# Patient Record
Sex: FEMALE | Race: BLACK OR AFRICAN AMERICAN | NOT HISPANIC OR LATINO | Employment: OTHER | URBAN - METROPOLITAN AREA
[De-identification: names, ages, dates, MRNs, and addresses within clinical notes are randomized per-mention and may not be internally consistent; named-entity substitution may affect disease eponyms.]

---

## 2017-06-13 ENCOUNTER — HOSPITAL ENCOUNTER (EMERGENCY)
Facility: HOSPITAL | Age: 27
Discharge: HOME | End: 2017-06-14
Attending: EMERGENCY MEDICINE
Payer: MEDICAID

## 2017-06-13 DIAGNOSIS — K80.50 BILIARY COLIC: Primary | ICD-10-CM

## 2017-06-13 DIAGNOSIS — R10.11 ABDOMINAL PAIN, RIGHT UPPER QUADRANT: ICD-10-CM

## 2017-06-13 DIAGNOSIS — R10.9 ABDOMINAL PAIN: ICD-10-CM

## 2017-06-13 LAB
ALBUMIN SERPL BCP-MCNC: 3.7 G/DL
ALP SERPL-CCNC: 58 U/L
ALT SERPL W/O P-5'-P-CCNC: 29 U/L
ANION GAP SERPL CALC-SCNC: 10 MMOL/L
AST SERPL-CCNC: 47 U/L
B-HCG UR QL: NEGATIVE
BASOPHILS # BLD AUTO: 0.07 K/UL
BASOPHILS NFR BLD: 1 %
BILIRUB SERPL-MCNC: 0.2 MG/DL
BILIRUB UR QL STRIP: NEGATIVE
BUN SERPL-MCNC: 6 MG/DL
CALCIUM SERPL-MCNC: 9.8 MG/DL
CHLORIDE SERPL-SCNC: 107 MMOL/L
CLARITY UR REFRACT.AUTO: CLEAR
CO2 SERPL-SCNC: 26 MMOL/L
COLOR UR AUTO: YELLOW
CREAT SERPL-MCNC: 0.8 MG/DL
CTP QC/QA: YES
DIFFERENTIAL METHOD: ABNORMAL
EOSINOPHIL # BLD AUTO: 0.5 K/UL
EOSINOPHIL NFR BLD: 6.9 %
ERYTHROCYTE [DISTWIDTH] IN BLOOD BY AUTOMATED COUNT: 13 %
EST. GFR  (AFRICAN AMERICAN): >60 ML/MIN/1.73 M^2
EST. GFR  (NON AFRICAN AMERICAN): >60 ML/MIN/1.73 M^2
GLUCOSE SERPL-MCNC: 82 MG/DL
GLUCOSE UR QL STRIP: NEGATIVE
HCT VFR BLD AUTO: 36.8 %
HGB BLD-MCNC: 12.6 G/DL
HGB UR QL STRIP: NEGATIVE
KETONES UR QL STRIP: NEGATIVE
LEUKOCYTE ESTERASE UR QL STRIP: NEGATIVE
LIPASE SERPL-CCNC: 21 U/L
LYMPHOCYTES # BLD AUTO: 3.4 K/UL
LYMPHOCYTES NFR BLD: 49.4 %
MCH RBC QN AUTO: 31 PG
MCHC RBC AUTO-ENTMCNC: 34.2 %
MCV RBC AUTO: 91 FL
MONOCYTES # BLD AUTO: 0.8 K/UL
MONOCYTES NFR BLD: 11.3 %
NEUTROPHILS # BLD AUTO: 2.2 K/UL
NEUTROPHILS NFR BLD: 31.3 %
NITRITE UR QL STRIP: NEGATIVE
PH UR STRIP: 7 [PH] (ref 5–8)
PLATELET # BLD AUTO: 280 K/UL
PMV BLD AUTO: 10.1 FL
POTASSIUM SERPL-SCNC: 4.3 MMOL/L
PROT SERPL-MCNC: 8.4 G/DL
PROT UR QL STRIP: NEGATIVE
RBC # BLD AUTO: 4.06 M/UL
SODIUM SERPL-SCNC: 143 MMOL/L
SP GR UR STRIP: 1.01 (ref 1–1.03)
URN SPEC COLLECT METH UR: NORMAL
UROBILINOGEN UR STRIP-ACNC: 4 EU/DL
WBC # BLD AUTO: 6.97 K/UL

## 2017-06-13 PROCEDURE — 83690 ASSAY OF LIPASE: CPT

## 2017-06-13 PROCEDURE — 25000003 PHARM REV CODE 250: Performed by: EMERGENCY MEDICINE

## 2017-06-13 PROCEDURE — 99284 EMERGENCY DEPT VISIT MOD MDM: CPT | Mod: 25

## 2017-06-13 PROCEDURE — 96374 THER/PROPH/DIAG INJ IV PUSH: CPT

## 2017-06-13 PROCEDURE — 85025 COMPLETE CBC W/AUTO DIFF WBC: CPT

## 2017-06-13 PROCEDURE — 81003 URINALYSIS AUTO W/O SCOPE: CPT

## 2017-06-13 PROCEDURE — 99285 EMERGENCY DEPT VISIT HI MDM: CPT | Mod: ,,, | Performed by: EMERGENCY MEDICINE

## 2017-06-13 PROCEDURE — 81025 URINE PREGNANCY TEST: CPT | Performed by: FAMILY MEDICINE

## 2017-06-13 PROCEDURE — 80053 COMPREHEN METABOLIC PANEL: CPT

## 2017-06-13 RX ORDER — DIAZEPAM 2 MG/1
2 TABLET ORAL 3 TIMES DAILY
COMMUNITY

## 2017-06-13 RX ORDER — FERROUS SULFATE, DRIED 160(50) MG
1 TABLET, EXTENDED RELEASE ORAL DAILY
COMMUNITY

## 2017-06-13 RX ORDER — FAMOTIDINE 10 MG/ML
20 INJECTION INTRAVENOUS
Status: COMPLETED | OUTPATIENT
Start: 2017-06-13 | End: 2017-06-13

## 2017-06-13 RX ADMIN — METOCLOPRAMIDE HYDROCHLORIDE 20 MG: 5 INJECTION, SOLUTION INTRAMUSCULAR; INTRAVENOUS at 10:06

## 2017-06-14 VITALS
HEART RATE: 82 BPM | BODY MASS INDEX: 18.1 KG/M2 | TEMPERATURE: 97 F | HEIGHT: 64 IN | DIASTOLIC BLOOD PRESSURE: 70 MMHG | SYSTOLIC BLOOD PRESSURE: 135 MMHG | RESPIRATION RATE: 18 BRPM | WEIGHT: 106 LBS | OXYGEN SATURATION: 100 %

## 2017-06-14 RX ORDER — HYDROCODONE BITARTRATE AND ACETAMINOPHEN 5; 325 MG/1; MG/1
1 TABLET ORAL EVERY 4 HOURS PRN
Qty: 12 TABLET | Refills: 0 | Status: SHIPPED | OUTPATIENT
Start: 2017-06-14 | End: 2017-07-03

## 2017-06-14 NOTE — ED TRIAGE NOTES
Per sister, patient vomiting last Wednesday all day. Over the next few days, pt c/o abdominal pain and tenderness. Pt reports RLQ abdominal pain.Pt c/o bilateral foot swelling and numb lips. Pt has had decreased appetite and no BM since Wednesday.

## 2017-06-14 NOTE — ED NOTES
LOC: The patient is awake, alert, aware of environment with an appropriate affect.  APPEARANCE: Pt resting comfortably, in no acute distress  SKIN: Skin warm, dry and intact, normal skin turgor, moist mucus membranes  RESPIRATORY: Airway is open and patent, respirations are spontaneous  CARDIAC: Normal rate and rhythm  ABDOMEN: Soft, tender to RLQ, nondistended. Bowel sounds present. No BW since Wednesday, decreased appetite.  NEUROLOGIC: patient moving all extremities spontaneously  Follows all commands appropriately  MUSCULOSKELETAL: Hx cerebral palsy.

## 2017-06-14 NOTE — ED PROVIDER NOTES
Encounter Date: 6/13/2017    SCRIBE #1 NOTE: I, Mahnazchrista Blanca, am scribing for, and in the presence of, Dr. Decker.       History     Chief Complaint   Patient presents with    Abdominal Pain     Patient had a stomach virus last week. Patient reports abdominal pain. Patient has been taking Ibuprofen, no relief. Pt also reports acid reflux and bilateral foot swelling     Review of patient's allergies indicates:   Allergen Reactions    Shellfish containing products Anaphylaxis    Pcn [penicillins]      Hives     Time seen by provider: 10:33 PM    This is a 26 y.o. female with a PMHx of cerebral palsy and GERD who presents with complaint of abdominal pain. The patient's caregiver reports about a week ago she woke up with nausea and vomiting. This lasted one day and resolved but was followed by right sided abdominal pain that has persisted since. The patient is concerned about the chronicity of her pain. She denies any nausea, vomiting, diarrhea since a week ago, she has never had associated fever.       The history is provided by the patient and a caregiver.     Past Medical History:   Diagnosis Date    Cerebral palsy     GERD (gastroesophageal reflux disease)     Scoliosis      Past Surgical History:   Procedure Laterality Date    HIP SURGERY  2000    Patient has surgery to straighten leg bone.     HIP SURGERY      vascular pump       Family History   Problem Relation Age of Onset    Diabetes Maternal Grandmother     Heart disease Maternal Grandmother      Social History   Substance Use Topics    Smoking status: Never Smoker    Smokeless tobacco: Not on file    Alcohol use No     Review of Systems   Constitutional: Negative for chills and fever.   HENT: Negative for facial swelling and nosebleeds.    Eyes: Negative for visual disturbance.   Respiratory: Negative for cough and shortness of breath.    Cardiovascular: Negative for chest pain and palpitations.   Gastrointestinal: Positive for abdominal pain.  Negative for abdominal distention, diarrhea, nausea and vomiting.   Genitourinary: Negative for difficulty urinating, dysuria, frequency and hematuria.   Musculoskeletal: Negative for neck pain and neck stiffness.   Skin: Negative for rash.   Neurological: Negative for seizures, syncope and speech difficulty.       Physical Exam     Initial Vitals [06/13/17 1918]   BP Pulse Resp Temp SpO2   113/74 74 18 97 °F (36.1 °C) 99 %     Physical Exam    Nursing note and vitals reviewed.  Constitutional: She appears well-developed and well-nourished. She is not diaphoretic. No distress.   HENT:   Head: Normocephalic and atraumatic.   Eyes: EOM are normal. Pupils are equal, round, and reactive to light.   Neck: Normal range of motion. Neck supple.   Cardiovascular: Normal rate and regular rhythm. Exam reveals no gallop and no friction rub.    No murmur heard.  Pulmonary/Chest: Breath sounds normal. No respiratory distress. She has no wheezes. She has no rhonchi. She has no rales.   Abdominal: Soft. She exhibits no distension. There is tenderness. There is no rebound and no guarding.   Mild to moderate right upper quadrant tenderness to palpation.    Musculoskeletal: Normal range of motion. She exhibits no edema or tenderness.   Neurological: She is alert and oriented to person, place, and time. She has normal strength.   Spastic movements of extremities consistent with cerebral palsy. Neurologically at baseline.    Skin: Skin is warm and dry. No rash noted. No erythema.   Psychiatric: She has a normal mood and affect. Her behavior is normal. Judgment and thought content normal.         ED Course   Procedures  Labs Reviewed   CBC W/ AUTO DIFFERENTIAL - Abnormal; Notable for the following:        Result Value    Hematocrit 36.8 (*)     Gran% 31.3 (*)     Lymph% 49.4 (*)     All other components within normal limits   COMPREHENSIVE METABOLIC PANEL - Abnormal; Notable for the following:     AST 47 (*)     All other components  within normal limits   LIPASE   URINALYSIS, REFLEX TO URINE CULTURE   POCT URINE PREGNANCY          X-Rays:   Independently Interpreted Readings:   Other Readings:  Right upper quadrant ultrasound: acute cholecystis.     Medical Decision Making:   History:   Old Medical Records: I decided to obtain old medical records.  Initial Assessment:   My initial differential diagnoses include, but are not limited to: cholecystitis, hepatitis, pancreatitis, gastritis. Patient with 5 days of right upper quadrant abdominal pain. Will check abdominal labs, right upper quadrant ultrasound and treat with Pepcid. If all are negative, will likely discharge home.   Independently Interpreted Test(s):   I have ordered and independently interpreted X-rays - see prior notes.  Clinical Tests:   Lab Tests: Ordered and Reviewed  Radiological Study: Ordered  ED Management:  Patient seen by general surgery. They reviewed imaging and examined to patient. They feel this is more consistent with biliary colic and does not require emergent surgery will discharge home and have dysfunctional uterine bleeding with general surgery as an outpatient.   Other:   I discussed test(s) with the performing physician.       <> Summary of the Findings: RUQ US: concerning for acute cholecystitis  I have discussed this case with another health care provider.       <> Summary of the Discussion: Surg            Scribe Attestation:   Scribe #1: I performed the above scribed service and the documentation accurately describes the services I performed. I attest to the accuracy of the note.    Attending Attestation:           Physician Attestation for Scribe:  Physician Attestation Statement for Scribe #1: I, Mahnaz Blanca, reviewed documentation, as scribed by Dr. Decker in my presence, and it is both accurate and complete.                 ED Course     Clinical Impression:   The primary encounter diagnosis was Biliary colic. A diagnosis of Abdominal pain was also  pertinent to this visit.    Disposition:   Disposition: Discharged  Condition: Stable       Jeffery Decker III, MD  06/20/17 0651

## 2017-06-14 NOTE — ED NOTES
I acknowledge care of this pt. The patient is awake, alert, with cooperative with a calm affect. Airway is open and patent, respirations are spontaneous; normal effort and rate noted, skin warm and dry, moves all extremities well. No apparent distress noted, bed placed in low position, side rails up x2. Pt aware of POC. Offers no complaints at this time. Family member at bedside.

## 2017-06-27 ENCOUNTER — OFFICE VISIT (OUTPATIENT)
Dept: SURGERY | Facility: CLINIC | Age: 27
End: 2017-06-27
Payer: MEDICAID

## 2017-06-27 VITALS — HEART RATE: 70 BPM | TEMPERATURE: 98 F | SYSTOLIC BLOOD PRESSURE: 110 MMHG | DIASTOLIC BLOOD PRESSURE: 58 MMHG

## 2017-06-27 DIAGNOSIS — K80.20 GALLSTONES: Primary | ICD-10-CM

## 2017-06-27 DIAGNOSIS — K80.50 BILIARY COLIC: ICD-10-CM

## 2017-06-27 PROCEDURE — 99999 PR PBB SHADOW E&M-EST. PATIENT-LVL IV: CPT | Mod: PBBFAC,,, | Performed by: PHYSICIAN ASSISTANT

## 2017-06-27 PROCEDURE — 99214 OFFICE O/P EST MOD 30 MIN: CPT | Mod: PBBFAC | Performed by: PHYSICIAN ASSISTANT

## 2017-06-27 PROCEDURE — 99203 OFFICE O/P NEW LOW 30 MIN: CPT | Mod: S$PBB,,, | Performed by: PHYSICIAN ASSISTANT

## 2017-06-27 RX ORDER — SODIUM CHLORIDE 9 MG/ML
INJECTION, SOLUTION INTRAVENOUS CONTINUOUS
Status: CANCELLED | OUTPATIENT
Start: 2017-06-27

## 2017-06-27 RX ORDER — NORETHINDRONE ACETATE AND ETHINYL ESTRADIOL AND FERROUS FUMARATE 1MG-20(24)
KIT ORAL
Refills: 11 | COMMUNITY
Start: 2017-05-31 | End: 2017-07-03 | Stop reason: SDUPTHER

## 2017-06-27 NOTE — PROGRESS NOTES
History & Physical    SUBJECTIVE:     History of Present Illness:  Patient is a 26 y.o. female presents with RUQ pain and vomiting which woke her up from sleep. Onset of symptoms was abrupt starting a few weeks ago with gradually worsening course since that time. She has a past medical history of cerebral palsy and GERD. She states she threw up and had RUQ pain after eating fatty foods. She has now adjusted her diet and has not had an episode since. She denies nausea. Bowel movements have been regular. Patient denies fevers or chills. Symptoms are aggravated by certain foods. Symptoms improve with food avoidance. She is here for evaluation of her gallbladder for removal.     Chief Complaint   Patient presents with    Consult       Review of patient's allergies indicates:   Allergen Reactions    Shellfish containing products Anaphylaxis    Pcn [penicillins]      Hives       Current Outpatient Prescriptions   Medication Sig Dispense Refill    calcium-vitamin D3 500 mg(1,250mg) -200 unit per tablet Take 1 tablet by mouth 2 (two) times daily with meals.      diazePAM (VALIUM) 2 MG tablet Take 2 mg by mouth every 6 (six) hours as needed for Anxiety.      famotidine (PEPCID) 40 MG tablet Take 40 mg by mouth once daily.        hydrocodone-acetaminophen 5-325mg (NORCO) 5-325 mg per tablet Take 1 tablet by mouth every 4 (four) hours as needed for Pain. 12 tablet 0    ibuprofen (ADVIL,MOTRIN) 400 MG tablet Take 1 tablet (400 mg total) by mouth every 6 (six) hours as needed (pain). 15 tablet 0    MINASTRIN 24 FE 1 mg-20 mcg(24) /75 mg (4) Chew CSW ONE T  D  11    norethindrone-ethinyl estradiol (MICROGESTIN 1/20) 1-20 mg-mcg per tablet Take 1 tablet by mouth once daily.        pantoprazole (PROTONIX) 40 MG tablet Take 40 mg by mouth once daily.         No current facility-administered medications for this visit.        Past Medical History:   Diagnosis Date    Cerebral palsy     GERD (gastroesophageal reflux  disease)     Scoliosis      Past Surgical History:   Procedure Laterality Date    HIP SURGERY  2000    Patient has surgery to straighten leg bone.     HIP SURGERY      vascular pump       Family History   Problem Relation Age of Onset    Diabetes Maternal Grandmother     Heart disease Maternal Grandmother      Social History   Substance Use Topics    Smoking status: Never Smoker    Smokeless tobacco: Not on file    Alcohol use No        Review of Systems:  Review of Systems   Constitutional: Negative for chills and fever.   HENT: Negative for congestion and voice change.    Eyes: Negative for photophobia and visual disturbance.   Respiratory: Negative for cough and shortness of breath.    Cardiovascular: Negative for chest pain and palpitations.   Gastrointestinal: Positive for abdominal pain and vomiting. Negative for constipation and nausea.   Endocrine: Negative for cold intolerance and heat intolerance.   Musculoskeletal: Negative for arthralgias and myalgias.   Skin: Negative for rash and wound.   Allergic/Immunologic: Negative for immunocompromised state.   Neurological: Negative for tremors and weakness.   Psychiatric/Behavioral: Negative for agitation.       OBJECTIVE:     Vital Signs (Most Recent)  Temp: 98.3 °F (36.8 °C) (06/27/17 1128)  Pulse: 70 (06/27/17 1128)  BP: (!) 110/58 (06/27/17 1128)           Physical Exam:  Physical Exam   Constitutional: She appears well-developed and well-nourished. No distress.   HENT:   Head: Normocephalic and atraumatic.   Eyes: No scleral icterus.   Neck: Normal range of motion.   Cardiovascular: Normal rate and regular rhythm.    Pulmonary/Chest: Effort normal. No respiratory distress.   Abdominal: Soft. She exhibits no distension. There is tenderness (+RUQ).   Musculoskeletal: She exhibits no edema or tenderness.   In wheelchair   Neurological: She is alert.   Skin: Skin is warm and dry.     Diagnostic Results:  Narrative     Time of Procedure: 06/14/17  00:19:00  Accession # [id number]    Reason for study: Abdominal pain, right upper quadrant    Comparison: CT 8/16/2011    Technique: Limited right upper quadrant ultrasound was performed.    Findings: The liver is normal in size measuring 16cm.  Hepatic parenchyma is homogeneous without evidence for masses.  No intra- or extrahepatic biliary ductal dilatation. The common bile duct measures 0.3 cm.  The gallbladder is distended with biliary sludge and numerous mobile stones the largest measuring 1.0 cm. Sonographic Tse's sign is positive. Gallbladder wall is not hyperemic or thickened and there is no pericholecystic fluid. The visualized portions of the pancreas appear unremarkable.  No ascites.   Impression         Distended gallbladder with multiple gallstones and positive sonographic Tse's sign concerning for acute cholecystitis in the appropriate clinical setting.          ASSESSMENT/PLAN:   27 yo female w biliary colic, cholelithiasis  -plan for cholecystectomy in OR  Risks and benefits as well as post-operative recovery expectations and restrictions discussed in detail in clinic. Informed consent obtained.

## 2017-07-03 ENCOUNTER — ANESTHESIA EVENT (OUTPATIENT)
Dept: SURGERY | Facility: HOSPITAL | Age: 27
End: 2017-07-03
Payer: MEDICAID

## 2017-07-03 ENCOUNTER — TELEPHONE (OUTPATIENT)
Dept: SURGERY | Facility: CLINIC | Age: 27
End: 2017-07-03

## 2017-07-03 NOTE — TELEPHONE ENCOUNTER
Left a message with patient's Emergency Contact at [phone] for her to arrive [facility] Wednesday 7/5/17 at 8:30am for surgery with Dr. Goldberg.  He verbalized understanding.

## 2017-07-03 NOTE — ANESTHESIA PREPROCEDURE EVALUATION
Pre-operative evaluation for Procedure(s) (LRB):  CHOLECYSTECTOMY-LAPAROSCOPIC (N/A)    Mrs. Acharya is a 25 yo F with a past medical history significant for cerebral palsy, GERD, allergy to penicillins, and Scoliosis who presents for the above stated procedure.     Last Airway:  None on file    There is no problem list on file for this patient.      Review of patient's allergies indicates:   Allergen Reactions    Shellfish containing products Anaphylaxis    Pcn [penicillins]      Hives       No current facility-administered medications on file prior to encounter.      Current Outpatient Prescriptions on File Prior to Encounter   Medication Sig Dispense Refill    calcium-vitamin D3 500 mg(1,250mg) -200 unit per tablet Take 1 tablet by mouth once daily.       diazePAM (VALIUM) 2 MG tablet Take 2 mg by mouth 3 (three) times daily.       famotidine (PEPCID) 40 MG tablet Take 40 mg by mouth once daily.        ibuprofen (ADVIL,MOTRIN) 400 MG tablet Take 1 tablet (400 mg total) by mouth every 6 (six) hours as needed (pain). 15 tablet 0    norethindrone-ethinyl estradiol (MICROGESTIN 1/20) 1-20 mg-mcg per tablet Take 1 tablet by mouth every evening.          Past Surgical History:   Procedure Laterality Date    HIP SURGERY  2000    Patient has surgery to straighten leg bone.     HIP SURGERY      vascular pump         Social History     Social History    Marital status: Single     Spouse name: N/A    Number of children: N/A    Years of education: N/A     Occupational History    Not on file.     Social History Main Topics    Smoking status: Never Smoker    Smokeless tobacco: Not on file    Alcohol use No    Drug use: No    Sexual activity: No     Other Topics Concern    Not on file     Social History Narrative    No narrative on file         Vital Signs Range (Last 24H):         CBC: No results for input(s): WBC, RBC, HGB, HCT, PLT, MCV, MCH, MCHC in the last 72 hours.    CMP: No results for input(s):  NA, K, CL, CO2, BUN, CREATININE, GLU, MG, PHOS, CALCIUM, ALBUMIN, PROT, ALKPHOS, ALT, AST, BILITOT in the last 72 hours.    INR  No results for input(s): INR, PROTIME, APTT in the last 72 hours.    Invalid input(s): PT        Diagnostic Studies:      EKG: None on file.       2D Echo: None on file.     Anesthesia Evaluation    I have reviewed the Patient Summary Reports.    I have reviewed the Nursing Notes.   I have reviewed the Medications.     Review of Systems  Anesthesia Hx:  History of prior surgery of interest to airway management or planning: Previous anesthesia: General Denies Family Hx of Anesthesia complications.  Personal Hx of Anesthesia complications, Post-Operative Nausea/Vomiting       Physical Exam  General:  Well nourished    Airway/Jaw/Neck:  Airway Findings: Mouth Opening: Normal Tongue: Normal  General Airway Assessment: Adult  Mallampati: II  Improves to I with phonation.  TM Distance: Normal, at least 6 cm        Eyes/Ears/Nose:  EYES/EARS/NOSE FINDINGS: Normal   Dental:  Dental Findings: In tact   Chest/Lungs:  Chest/Lungs Clear    Heart/Vascular:  Heart Findings: Normal Heart murmur: negative    Abdomen:  Abdomen Findings: Normal    Musculoskeletal:  Musculoskeletal Findings: Scoliosis    Skin:  Skin Findings: Normal    Mental Status:  Mental Status Findings: Normal        Anesthesia Plan  Type of Anesthesia, risks & benefits discussed:  Anesthesia Type:  general  Patient's Preference:   Intra-op Monitoring Plan: standard ASA monitors  Intra-op Monitoring Plan Comments:   Post Op Pain Control Plan: multimodal analgesia  Post Op Pain Control Plan Comments:   Induction:   IV  Beta Blocker:  Patient is not currently on a Beta-Blocker (No further documentation required).       Informed Consent: Patient understands risks and agrees with Anesthesia plan.  Questions answered. Anesthesia consent signed with patient.  ASA Score: 3     Day of Surgery Review of History & Physical:    H&P update referred  to the surgeon.         Ready For Surgery From Anesthesia Perspective.

## 2017-07-05 ENCOUNTER — ANESTHESIA (OUTPATIENT)
Dept: SURGERY | Facility: HOSPITAL | Age: 27
End: 2017-07-05
Payer: MEDICAID

## 2017-07-05 ENCOUNTER — HOSPITAL ENCOUNTER (OUTPATIENT)
Facility: HOSPITAL | Age: 27
Discharge: HOME | End: 2017-07-05
Attending: SURGERY | Admitting: SURGERY
Payer: MEDICAID

## 2017-07-05 ENCOUNTER — SURGERY (OUTPATIENT)
Age: 27
End: 2017-07-05

## 2017-07-05 VITALS
RESPIRATION RATE: 18 BRPM | WEIGHT: 106 LBS | OXYGEN SATURATION: 99 % | BODY MASS INDEX: 18.78 KG/M2 | HEIGHT: 63 IN | TEMPERATURE: 98 F | DIASTOLIC BLOOD PRESSURE: 69 MMHG | SYSTOLIC BLOOD PRESSURE: 110 MMHG | HEART RATE: 90 BPM

## 2017-07-05 DIAGNOSIS — K80.20 GALLSTONES: Primary | ICD-10-CM

## 2017-07-05 DIAGNOSIS — K80.50 BILIARY COLIC: ICD-10-CM

## 2017-07-05 PROCEDURE — 25000003 PHARM REV CODE 250: Performed by: STUDENT IN AN ORGANIZED HEALTH CARE EDUCATION/TRAINING PROGRAM

## 2017-07-05 PROCEDURE — 88304 TISSUE EXAM BY PATHOLOGIST: CPT | Mod: 26,,,

## 2017-07-05 PROCEDURE — 88304 TISSUE EXAM BY PATHOLOGIST: CPT

## 2017-07-05 PROCEDURE — 36000709 HC OR TIME LEV III EA ADD 15 MIN: Performed by: SURGERY

## 2017-07-05 PROCEDURE — 25000003 PHARM REV CODE 250: Performed by: GENERAL PRACTICE

## 2017-07-05 PROCEDURE — 63600175 PHARM REV CODE 636 W HCPCS: Performed by: STUDENT IN AN ORGANIZED HEALTH CARE EDUCATION/TRAINING PROGRAM

## 2017-07-05 PROCEDURE — 71000015 HC POSTOP RECOV 1ST HR: Performed by: SURGERY

## 2017-07-05 PROCEDURE — 25000003 PHARM REV CODE 250: Performed by: PHYSICIAN ASSISTANT

## 2017-07-05 PROCEDURE — 27000221 HC OXYGEN, UP TO 24 HOURS

## 2017-07-05 PROCEDURE — 25000003 PHARM REV CODE 250: Performed by: SURGERY

## 2017-07-05 PROCEDURE — 94760 N-INVAS EAR/PLS OXIMETRY 1: CPT | Mod: 59

## 2017-07-05 PROCEDURE — 37000009 HC ANESTHESIA EA ADD 15 MINS: Performed by: SURGERY

## 2017-07-05 PROCEDURE — 71000033 HC RECOVERY, INTIAL HOUR: Performed by: SURGERY

## 2017-07-05 PROCEDURE — 27201423 OPTIME MED/SURG SUP & DEVICES STERILE SUPPLY: Performed by: SURGERY

## 2017-07-05 PROCEDURE — 71000016 HC POSTOP RECOV ADDL HR: Performed by: SURGERY

## 2017-07-05 PROCEDURE — 71000039 HC RECOVERY, EACH ADD'L HOUR: Performed by: SURGERY

## 2017-07-05 PROCEDURE — D9220A PRA ANESTHESIA: Mod: ,,, | Performed by: ANESTHESIOLOGY

## 2017-07-05 PROCEDURE — 36000708 HC OR TIME LEV III 1ST 15 MIN: Performed by: SURGERY

## 2017-07-05 PROCEDURE — 47562 LAPAROSCOPIC CHOLECYSTECTOMY: CPT | Mod: ,,, | Performed by: SURGERY

## 2017-07-05 PROCEDURE — 37000008 HC ANESTHESIA 1ST 15 MINUTES: Performed by: SURGERY

## 2017-07-05 RX ORDER — ONDANSETRON 2 MG/ML
4 INJECTION INTRAMUSCULAR; INTRAVENOUS DAILY PRN
Status: DISCONTINUED | OUTPATIENT
Start: 2017-07-05 | End: 2017-07-05 | Stop reason: HOSPADM

## 2017-07-05 RX ORDER — NEOSTIGMINE METHYLSULFATE 1 MG/ML
INJECTION, SOLUTION INTRAVENOUS
Status: DISCONTINUED | OUTPATIENT
Start: 2017-07-05 | End: 2017-07-05

## 2017-07-05 RX ORDER — PROPOFOL 10 MG/ML
VIAL (ML) INTRAVENOUS
Status: DISCONTINUED | OUTPATIENT
Start: 2017-07-05 | End: 2017-07-05

## 2017-07-05 RX ORDER — LIDOCAINE HCL/PF 100 MG/5ML
SYRINGE (ML) INTRAVENOUS
Status: DISCONTINUED | OUTPATIENT
Start: 2017-07-05 | End: 2017-07-05

## 2017-07-05 RX ORDER — OXYCODONE AND ACETAMINOPHEN 5; 325 MG/1; MG/1
1 TABLET ORAL EVERY 4 HOURS PRN
Qty: 41 TABLET | Refills: 0 | Status: SHIPPED | OUTPATIENT
Start: 2017-07-05 | End: 2017-07-05

## 2017-07-05 RX ORDER — SODIUM CHLORIDE 9 MG/ML
INJECTION, SOLUTION INTRAVENOUS CONTINUOUS
Status: DISCONTINUED | OUTPATIENT
Start: 2017-07-05 | End: 2017-07-05 | Stop reason: HOSPADM

## 2017-07-05 RX ORDER — HYDROMORPHONE HYDROCHLORIDE 1 MG/ML
0.2 INJECTION, SOLUTION INTRAMUSCULAR; INTRAVENOUS; SUBCUTANEOUS EVERY 5 MIN PRN
Status: DISCONTINUED | OUTPATIENT
Start: 2017-07-05 | End: 2017-07-05 | Stop reason: HOSPADM

## 2017-07-05 RX ORDER — LIDOCAINE HYDROCHLORIDE 10 MG/ML
1 INJECTION, SOLUTION EPIDURAL; INFILTRATION; INTRACAUDAL; PERINEURAL ONCE
Status: DISCONTINUED | OUTPATIENT
Start: 2017-07-05 | End: 2017-07-05 | Stop reason: HOSPADM

## 2017-07-05 RX ORDER — KETOROLAC TROMETHAMINE 30 MG/ML
INJECTION, SOLUTION INTRAMUSCULAR; INTRAVENOUS
Status: DISCONTINUED | OUTPATIENT
Start: 2017-07-05 | End: 2017-07-05

## 2017-07-05 RX ORDER — ONDANSETRON 2 MG/ML
INJECTION INTRAMUSCULAR; INTRAVENOUS
Status: DISCONTINUED | OUTPATIENT
Start: 2017-07-05 | End: 2017-07-05

## 2017-07-05 RX ORDER — ROCURONIUM BROMIDE 10 MG/ML
INJECTION, SOLUTION INTRAVENOUS
Status: DISCONTINUED | OUTPATIENT
Start: 2017-07-05 | End: 2017-07-05

## 2017-07-05 RX ORDER — OXYCODONE AND ACETAMINOPHEN 10; 325 MG/1; MG/1
1 TABLET ORAL ONCE AS NEEDED
Status: COMPLETED | OUTPATIENT
Start: 2017-07-05 | End: 2017-07-05

## 2017-07-05 RX ORDER — ACETAMINOPHEN 10 MG/ML
INJECTION, SOLUTION INTRAVENOUS
Status: DISCONTINUED | OUTPATIENT
Start: 2017-07-05 | End: 2017-07-05

## 2017-07-05 RX ORDER — BUPIVACAINE HYDROCHLORIDE 5 MG/ML
INJECTION, SOLUTION EPIDURAL; INTRACAUDAL
Status: DISCONTINUED | OUTPATIENT
Start: 2017-07-05 | End: 2017-07-05 | Stop reason: HOSPADM

## 2017-07-05 RX ORDER — OXYCODONE AND ACETAMINOPHEN 5; 325 MG/1; MG/1
1 TABLET ORAL EVERY 4 HOURS PRN
Qty: 41 TABLET | Refills: 0 | Status: SHIPPED | OUTPATIENT
Start: 2017-07-05 | End: 2017-07-18

## 2017-07-05 RX ORDER — MIDAZOLAM HYDROCHLORIDE 1 MG/ML
INJECTION, SOLUTION INTRAMUSCULAR; INTRAVENOUS
Status: DISCONTINUED | OUTPATIENT
Start: 2017-07-05 | End: 2017-07-05

## 2017-07-05 RX ORDER — FENTANYL CITRATE 50 UG/ML
INJECTION, SOLUTION INTRAMUSCULAR; INTRAVENOUS
Status: DISCONTINUED | OUTPATIENT
Start: 2017-07-05 | End: 2017-07-05

## 2017-07-05 RX ORDER — KETAMINE HCL IN 0.9 % NACL 50 MG/5 ML
SYRINGE (ML) INTRAVENOUS
Status: DISCONTINUED | OUTPATIENT
Start: 2017-07-05 | End: 2017-07-05

## 2017-07-05 RX ORDER — DEXAMETHASONE SODIUM PHOSPHATE 4 MG/ML
INJECTION, SOLUTION INTRA-ARTICULAR; INTRALESIONAL; INTRAMUSCULAR; INTRAVENOUS; SOFT TISSUE
Status: DISCONTINUED | OUTPATIENT
Start: 2017-07-05 | End: 2017-07-05

## 2017-07-05 RX ORDER — GLYCOPYRROLATE 0.2 MG/ML
INJECTION INTRAMUSCULAR; INTRAVENOUS
Status: DISCONTINUED | OUTPATIENT
Start: 2017-07-05 | End: 2017-07-05

## 2017-07-05 RX ORDER — CLINDAMYCIN PHOSPHATE 900 MG/50ML
900 INJECTION, SOLUTION INTRAVENOUS
Status: COMPLETED | OUTPATIENT
Start: 2017-07-05 | End: 2017-07-05

## 2017-07-05 RX ORDER — PHENYLEPHRINE HYDROCHLORIDE 10 MG/ML
INJECTION INTRAVENOUS
Status: DISCONTINUED | OUTPATIENT
Start: 2017-07-05 | End: 2017-07-05

## 2017-07-05 RX ADMIN — ROCURONIUM BROMIDE 10 MG: 10 INJECTION, SOLUTION INTRAVENOUS at 11:07

## 2017-07-05 RX ADMIN — HYDROMORPHONE HYDROCHLORIDE 0.2 MG: 1 INJECTION, SOLUTION INTRAMUSCULAR; INTRAVENOUS; SUBCUTANEOUS at 01:07

## 2017-07-05 RX ADMIN — ROCURONIUM BROMIDE 20 MG: 10 INJECTION, SOLUTION INTRAVENOUS at 10:07

## 2017-07-05 RX ADMIN — LIDOCAINE HYDROCHLORIDE 75 MG: 20 INJECTION, SOLUTION INTRAVENOUS at 10:07

## 2017-07-05 RX ADMIN — FENTANYL CITRATE 50 MCG: 50 INJECTION, SOLUTION INTRAMUSCULAR; INTRAVENOUS at 10:07

## 2017-07-05 RX ADMIN — NEOSTIGMINE METHYLSULFATE 5 MG: 1 INJECTION INTRAVENOUS at 11:07

## 2017-07-05 RX ADMIN — SODIUM CHLORIDE, SODIUM GLUCONATE, SODIUM ACETATE, POTASSIUM CHLORIDE, MAGNESIUM CHLORIDE, SODIUM PHOSPHATE, DIBASIC, AND POTASSIUM PHOSPHATE: .53; .5; .37; .037; .03; .012; .00082 INJECTION, SOLUTION INTRAVENOUS at 11:07

## 2017-07-05 RX ADMIN — FENTANYL CITRATE 50 MCG: 50 INJECTION, SOLUTION INTRAMUSCULAR; INTRAVENOUS at 11:07

## 2017-07-05 RX ADMIN — CLINDAMYCIN PHOSPHATE 900 MG: 18 INJECTION, SOLUTION INTRAVENOUS at 10:07

## 2017-07-05 RX ADMIN — PHENYLEPHRINE HYDROCHLORIDE 100 MCG: 10 INJECTION INTRAVENOUS at 10:07

## 2017-07-05 RX ADMIN — ONDANSETRON 4 MG: 2 INJECTION INTRAMUSCULAR; INTRAVENOUS at 11:07

## 2017-07-05 RX ADMIN — PROPOFOL 150 MG: 10 INJECTION, EMULSION INTRAVENOUS at 10:07

## 2017-07-05 RX ADMIN — DEXAMETHASONE SODIUM PHOSPHATE 8 MG: 4 INJECTION, SOLUTION INTRAMUSCULAR; INTRAVENOUS at 11:07

## 2017-07-05 RX ADMIN — Medication 30 MG: at 10:07

## 2017-07-05 RX ADMIN — SODIUM CHLORIDE: 0.9 INJECTION, SOLUTION INTRAVENOUS at 09:07

## 2017-07-05 RX ADMIN — GLYCOPYRROLATE 0.6 MG: 0.2 INJECTION, SOLUTION INTRAMUSCULAR; INTRAVENOUS at 11:07

## 2017-07-05 RX ADMIN — ACETAMINOPHEN 1000 MG: 10 INJECTION, SOLUTION INTRAVENOUS at 11:07

## 2017-07-05 RX ADMIN — BUPIVACAINE HYDROCHLORIDE 30 ML: 5 INJECTION, SOLUTION EPIDURAL; INTRACAUDAL; PERINEURAL at 11:07

## 2017-07-05 RX ADMIN — MIDAZOLAM HYDROCHLORIDE 2 MG: 1 INJECTION, SOLUTION INTRAMUSCULAR; INTRAVENOUS at 10:07

## 2017-07-05 RX ADMIN — OXYCODONE HYDROCHLORIDE AND ACETAMINOPHEN 1 TABLET: 10; 325 TABLET ORAL at 02:07

## 2017-07-05 RX ADMIN — SODIUM CHLORIDE: 0.9 INJECTION, SOLUTION INTRAVENOUS at 10:07

## 2017-07-05 RX ADMIN — ROCURONIUM BROMIDE 10 MG: 10 INJECTION, SOLUTION INTRAVENOUS at 10:07

## 2017-07-05 RX ADMIN — KETOROLAC TROMETHAMINE 15 MG: 30 INJECTION, SOLUTION INTRAMUSCULAR; INTRAVENOUS at 11:07

## 2017-07-05 NOTE — PLAN OF CARE
Pt awake and alert. Denies pain or nausea. Does not verbalize response but nods appropriately. Dressings to abdomen with scant amount of drainage. Pt to be discharged home per [facility]. Safety maintained.

## 2017-07-05 NOTE — DISCHARGE SUMMARY
[facility]  Discharge Summary  General Surgery      Admit Date: 7/5/2017    Discharge Date: 7/5/2017    Attending Physician: Joshua Goldberg, MD     Discharge Provider: Jose Rafael Cummings MD    Reason for Admission: Elective surgery    Procedures Performed: Procedure(s) (LRB):  CHOLECYSTECTOMY-LAPAROSCOPIC (N/A)    Hospital Course:  Patient is a 26-year-old female with biliary colic and cholelithiasis who presented for planned outpatient procedure. She was taken to the Operating Room for laparoscopic cholecystectomy. She tolerated the procedure well with no apparent complication. She was discharged to home in good condition following an uneventful stay in Recovery.    Consults: None    Significant Diagnostic Studies: None    Final Diagnoses:   Principal Problem: Cholelithiasis   Secondary Diagnoses: Biliary colic    Discharged Condition: Good    Disposition: Home or Self Care    Follow Up/Patient Instructions: Follow up with Dr. Goldberg in 2 weeks.    Medications:  Reconciled Home Medications:   Current Discharge Medication List      START taking these medications    Details   oxycodone-acetaminophen (PERCOCET) 5-325 mg per tablet Take 1 tablet by mouth every 4 (four) hours as needed for Pain.  Qty: 41 tablet, Refills: 0         CONTINUE these medications which have NOT CHANGED    Details   calcium-vitamin D3 500 mg(1,250mg) -200 unit per tablet Take 1 tablet by mouth once daily.       diazePAM (VALIUM) 2 MG tablet Take 2 mg by mouth 3 (three) times daily.       famotidine (PEPCID) 40 MG tablet Take 40 mg by mouth once daily.        ibuprofen (ADVIL,MOTRIN) 400 MG tablet Take 1 tablet (400 mg total) by mouth every 6 (six) hours as needed (pain).  Qty: 15 tablet, Refills: 0      norethindrone-ethinyl estradiol (MICROGESTIN 1/20) 1-20 mg-mcg per tablet Take 1 tablet by mouth every evening.              Discharge Procedure Orders  Diet general     Activity as tolerated     Shower on day dressing removed (No  bath)   Order Comments: May remove clear bandage and non-stick gauze in 48 hours. White tape strips to remain in place. Will fall off on their own. If not, we will remove them in clinic at follow up visit in 2 weeks. After 48 hours, no dressing required. May shower with soap and water. Dab dry. Do not scrub vigorously. Do not submerge incisions for 2 weeks.     Lifting restrictions   Order Comments: No lifting greater than 10 pounds for 4-6 weeks.     Call MD for:  increased confusion or weakness     Call MD for:  persistent dizziness, light-headedness, or visual disturbances     Call MD for:  worsening rash     Call MD for:  severe persistent headache     Call MD for:  difficulty breathing or increased cough     Call MD for:  redness, tenderness, or signs of infection (pain, swelling, redness, odor or green/yellow discharge around incision site)     Call MD for:  severe uncontrolled pain     Call MD for:  persistent nausea and vomiting or diarrhea     Call MD for:  temperature >100.4     Remove dressing in 48 hours       Follow-up Information     Joshua Goldberg, MD In 2 weeks.    Specialty:  General Surgery  Why:  s/p julianne winkler  Contact information:  [facility] [id number]  [phone]                   Activity: As tolerated. Lifting precautions.  Diet: Adult regular.  Wound Care: As above.

## 2017-07-05 NOTE — H&P (VIEW-ONLY)
History & Physical    SUBJECTIVE:     History of Present Illness:  Patient is a 26 y.o. female presents with RUQ pain and vomiting which woke her up from sleep. Onset of symptoms was abrupt starting a few weeks ago with gradually worsening course since that time. She has a past medical history of cerebral palsy and GERD. She states she threw up and had RUQ pain after eating fatty foods. She has now adjusted her diet and has not had an episode since. She denies nausea. Bowel movements have been regular. Patient denies fevers or chills. Symptoms are aggravated by certain foods. Symptoms improve with food avoidance. She is here for evaluation of her gallbladder for removal.     Chief Complaint   Patient presents with    Consult       Review of patient's allergies indicates:   Allergen Reactions    Shellfish containing products Anaphylaxis    Pcn [penicillins]      Hives       Current Outpatient Prescriptions   Medication Sig Dispense Refill    calcium-vitamin D3 500 mg(1,250mg) -200 unit per tablet Take 1 tablet by mouth 2 (two) times daily with meals.      diazePAM (VALIUM) 2 MG tablet Take 2 mg by mouth every 6 (six) hours as needed for Anxiety.      famotidine (PEPCID) 40 MG tablet Take 40 mg by mouth once daily.        hydrocodone-acetaminophen 5-325mg (NORCO) 5-325 mg per tablet Take 1 tablet by mouth every 4 (four) hours as needed for Pain. 12 tablet 0    ibuprofen (ADVIL,MOTRIN) 400 MG tablet Take 1 tablet (400 mg total) by mouth every 6 (six) hours as needed (pain). 15 tablet 0    MINASTRIN 24 FE 1 mg-20 mcg(24) /75 mg (4) Chew CSW ONE T  D  11    norethindrone-ethinyl estradiol (MICROGESTIN 1/20) 1-20 mg-mcg per tablet Take 1 tablet by mouth once daily.        pantoprazole (PROTONIX) 40 MG tablet Take 40 mg by mouth once daily.         No current facility-administered medications for this visit.        Past Medical History:   Diagnosis Date    Cerebral palsy     GERD (gastroesophageal reflux  disease)     Scoliosis      Past Surgical History:   Procedure Laterality Date    HIP SURGERY  2000    Patient has surgery to straighten leg bone.     HIP SURGERY      vascular pump       Family History   Problem Relation Age of Onset    Diabetes Maternal Grandmother     Heart disease Maternal Grandmother      Social History   Substance Use Topics    Smoking status: Never Smoker    Smokeless tobacco: Not on file    Alcohol use No        Review of Systems:  Review of Systems   Constitutional: Negative for chills and fever.   HENT: Negative for congestion and voice change.    Eyes: Negative for photophobia and visual disturbance.   Respiratory: Negative for cough and shortness of breath.    Cardiovascular: Negative for chest pain and palpitations.   Gastrointestinal: Positive for abdominal pain and vomiting. Negative for constipation and nausea.   Endocrine: Negative for cold intolerance and heat intolerance.   Musculoskeletal: Negative for arthralgias and myalgias.   Skin: Negative for rash and wound.   Allergic/Immunologic: Negative for immunocompromised state.   Neurological: Negative for tremors and weakness.   Psychiatric/Behavioral: Negative for agitation.       OBJECTIVE:     Vital Signs (Most Recent)  Temp: 98.3 °F (36.8 °C) (06/27/17 1128)  Pulse: 70 (06/27/17 1128)  BP: (!) 110/58 (06/27/17 1128)           Physical Exam:  Physical Exam   Constitutional: She appears well-developed and well-nourished. No distress.   HENT:   Head: Normocephalic and atraumatic.   Eyes: No scleral icterus.   Neck: Normal range of motion.   Cardiovascular: Normal rate and regular rhythm.    Pulmonary/Chest: Effort normal. No respiratory distress.   Abdominal: Soft. She exhibits no distension. There is tenderness (+RUQ).   Musculoskeletal: She exhibits no edema or tenderness.   In wheelchair   Neurological: She is alert.   Skin: Skin is warm and dry.     Diagnostic Results:  Narrative     Time of Procedure: 06/14/17  00:19:00  Accession # [id number]    Reason for study: Abdominal pain, right upper quadrant    Comparison: CT 8/16/2011    Technique: Limited right upper quadrant ultrasound was performed.    Findings: The liver is normal in size measuring 16cm.  Hepatic parenchyma is homogeneous without evidence for masses.  No intra- or extrahepatic biliary ductal dilatation. The common bile duct measures 0.3 cm.  The gallbladder is distended with biliary sludge and numerous mobile stones the largest measuring 1.0 cm. Sonographic Tse's sign is positive. Gallbladder wall is not hyperemic or thickened and there is no pericholecystic fluid. The visualized portions of the pancreas appear unremarkable.  No ascites.   Impression         Distended gallbladder with multiple gallstones and positive sonographic Tse's sign concerning for acute cholecystitis in the appropriate clinical setting.          ASSESSMENT/PLAN:   25 yo female w biliary colic, cholelithiasis  -plan for cholecystectomy in OR  Risks and benefits as well as post-operative recovery expectations and restrictions discussed in detail in clinic. Informed consent obtained.

## 2017-07-05 NOTE — OP NOTE
DATE OF PROCEDURE: 07/05/2017     PREOPERATIVE DIAGNOSIS: Biliary colic.     POSTOPERATIVE DIAGNOSIS: Biliary colic.     PROCEDURE PERFORMED: Laparoscopic cholecystectomy.     ATTENDING SURGEON: Joshua Goldberg, M.D.     HOUSESTAFF SURGEON: Jose Rafael Cummings M.D. (RES)     ANESTHESIA: General endotracheal.     ESTIMATED BLOOD LOSS: 5 mL.     FINDINGS: Cholelithiasis and moderate inflammation. Multiple stones. No bile spillage.    SPECIMEN: Gallbladder.     DRAINS: None.     COMPLICATIONS: None.     INDICATIONS: Christin Dukes is a 26 y.o.female referred to my General Surgery Clinic with a history of postprandial right upper quadrant abdominal pain. The history and exam were consistent with biliary colic, which was confirmed by laboratory studies and ultrasound. We recommended laparoscopic cholecystectomy and the patient agreed to proceed. The patient signed informed consent and expressed understanding of the risks and benefits of surgery.     OPERATIVE PROCEDURE: The patient was identified in Preoperative Holding and brought back to the Operating Room. Placed supine on the operating table and padded appropriately. Monitors were applied and there was smooth induction of general endotracheal anesthesia. The patient's abdomen was prepped and draped   in the standard sterile surgical fashion. A time-out was performed and all team members present agreed this was the correct procedure on the correct patient.   We also confirmed administration of appropriate preoperative antibiotics.    Abdomen entered using 5-mm Visiport in subxiphoid position due to LLQ incision that closely approached the umbilicus. The abdomen was insufflated with carbon dioxide to a maximum pressure of 15 mmHg. The abdomen was inspected with no significant adhesions at the umbilicus. A 2-cm infraumbilical skin incision was made. Subcutaneous tissue was bluntly dissected. The umbilical stalk was grasped with penetrating towel clip and elevated and a  1.5-cm midline infraumbilical fascial incision was made. The abdomen was bluntly entered under direct vision. A 0 Vicryl stay suture was placed around the fascial incision in a horizontal mattress fashion. A Roxana trocar was placed. A 10-mm laparoscope was placed and the abdomen was examined. There was no evidence of injury from either  trocar placement. Two additional 5-mm trocars were placed under direct vision through separate stab incisions in the right upper quadrant. We directed our attention to the right upper quadrant. The gallbladder was identified and noted to have moderate significant inflammatory change. The fundus was grasped and retracted cranially and the infundibulum was grasped and retracted laterally. We bluntly dissected the peritoneal reflection off the infundibulum and neck of the gallbladder. With careful blunt dissection in this area, we were able to identify the cystic duct. Further careful dissection identified the cystic artery and we did obtain a critical view of safety. Both duct and artery were triply clipped and divided. The gallbladder was dissected off the gallbladder fossa using Bovie electrocautery from infundibulum to fundus until   free. It was placed into an EndoCatch bag and removed from the umbilical port site with no difficulty. We returned the laparoscope and Roxana trocar to the umbilicus and reexamined the right upper quadrant. The gallbladder fossa was examined and no further bleeding or any bile leak were noted. The clips on the cystic duct and artery were examined and no bleeding or bile leak were noted. The right upper quadrant was irrigated with saline briefly until the returning effluent was clear. All ports were removed under direct vision and no bleeding from any port site was noted. The insufflation of the abdomen was evacuated and the laparoscope and Roxana trocar were removed. The fascial incision at the umbilical port site was closed with the preexisting 0  Vicryl stitch. All port sites were infiltrated with Marcaine and   closed in a subcuticular fashion. Sterile dressings were applied. The patient was extubated in the Operating Room and transported to the Recovery Room in stable condition. All sponge, instrument and needle counts were correct at the end of the case. I was present and scrubbed for the entire procedure.

## 2017-07-05 NOTE — ANESTHESIA POSTPROCEDURE EVALUATION
"Anesthesia Post Evaluation    Patient: Christin Dukes    Procedure(s) Performed: Procedure(s) (LRB):  CHOLECYSTECTOMY-LAPAROSCOPIC (N/A)    Final Anesthesia Type: general  Patient location during evaluation: PACU  Patient participation: Yes- Able to Participate  Level of consciousness: awake and alert and oriented  Post-procedure vital signs: reviewed and stable  Pain management: adequate  Airway patency: patent  PONV status at discharge: No PONV  Anesthetic complications: no      Cardiovascular status: blood pressure returned to baseline and hemodynamically stable  Respiratory status: unassisted, spontaneous ventilation and room air  Hydration status: euvolemic  Follow-up not needed.        Visit Vitals  /64   Pulse 64   Temp 36.5 °C (97.7 °F) (Temporal)   Resp 16   Ht 5' 3" (1.6 m)   Wt 48.1 kg (106 lb)   LMP 07/03/2017 (Exact Date)   SpO2 100%   BMI 18.78 kg/m²       Pain/Jorge Alberto Score: Pain Assessment Performed: Yes (7/5/2017  1:55 PM)  Presence of Pain: non-verbal indicators absent (7/5/2017  1:55 PM)  Pain Rating Prior to Med Admin: 6 (7/5/2017  1:29 PM)  Jorge Alberto Score: 10 (7/5/2017  1:55 PM)      "

## 2017-07-05 NOTE — DISCHARGE INSTRUCTIONS
Discharge Instructions for  Cholecystectomy  You have had a procedure known as a cholecystectomy. This is a procedure to remove the gallbladder through an incision in your belly. You either had a vertical (up-and-down) incision in the middle of your belly or a crosswise incision in the upper-right part of your belly beneath your ribs.  You can live a full and healthy life without your gallbladder. This includes eating the foods and doing the things you enjoyed before your gallbladder problems started. Here are guidelines for home care after surgery.  Home care  Recommendations for home care include the following:  · Ask someone to drive you to your appointments for the next week. Dont drive until you are no longer taking pain medicine and can step on the brake pedal without hesitation.   · Dont worry if you feel tired for the first couple of weeks after your operation. Fatigue is common:   ¨ Nap when you feel tired.  ¨ Get plenty of rest.  · Walk around the house, do office work, climb stairs, or ride in a car if you feel able to do so.  · Dont do any strenuous physical activities, heavy lifting (nothing heavier than 10 pounds), or sports for 4 weeks after surgery.  · Eat your normal diet. If your healthcare provider recommends a low-fat diet, ask for menus and other diet information.  · Gently wash the skin around your incision daily with mild soap and water.  · If there is a gauze dressing on your incision, change it daily or as often as necessary to keep it dry and clean.  · You may take a shower (even if there is a surgical drain in place), unless your healthcare provider gives you different directions.  · Dont sit in a bathtub, swimming pool, or hot tub until the incision is closed and any surgical drains are removed.     When to call your healthcare provider  Call your healthcare provider right away if you have any of the following:  · Yellowing of your skin or eyes (jaundice)  · Chills  · Fever of  100.4°F (38.0°C) or higher, or as directed by your healthcare provider  · Redness, swelling, increasing pain, pus, or a foul smell at the incision site  · Dark or rust-colored urine  · Stool that is rosa-colored or light in color instead of brown  · Nausea and vomiting  · Increasing belly pain  · Rectal bleeding  · Leg swelling or trouble breathing

## 2017-07-05 NOTE — TRANSFER OF CARE
"Anesthesia Transfer of Care Note    Patient: Christin Dukes    Procedure(s) Performed: Procedure(s) (LRB):  CHOLECYSTECTOMY-LAPAROSCOPIC (N/A)    Patient location: PACU    Anesthesia Type: general    Transport from OR: Transported from OR on 6-10 L/min O2 by face mask with adequate spontaneous ventilation    Post pain: adequate analgesia    Post assessment: no apparent anesthetic complications    Post vital signs: stable    Level of consciousness: responds to stimulation    Nausea/Vomiting: no nausea/vomiting    Complications: none, prolonged emergence     Transfer of care protocol was followed      Last vitals:   Visit Vitals  /65 (BP Location: Left arm, Patient Position: Lying, BP Method: Automatic)   Pulse 84   Temp 37.1 °C (98.8 °F) (Axillary)   Resp 18   Ht 5' 3" (1.6 m)   Wt 48.1 kg (106 lb)   LMP 07/03/2017 (Exact Date)   SpO2 100%   BMI 18.78 kg/m²     "

## 2017-07-05 NOTE — PLAN OF CARE
Problem: Patient Care Overview  Goal: Plan of Care Review  Outcome: Outcome(s) achieved Date Met: 07/05/17    Discharge instructions and prescription given to patient and sibling. Verbalized understanding. Patient stable, tolerating fluids. No complaints at this time. Patient adequate for discharge.

## 2017-07-05 NOTE — ANESTHESIA RELEASE NOTE
"Anesthesia Release from PACU Note    Patient: Christin Dukes    Procedure(s) Performed: Procedure(s) (LRB):  CHOLECYSTECTOMY-LAPAROSCOPIC (N/A)    Anesthesia type: general    Post pain: Adequate analgesia    Post assessment: no apparent anesthetic complications, tolerated procedure well and no evidence of recall    Last Vitals:   Visit Vitals  /64   Pulse 64   Temp 36.5 °C (97.7 °F) (Temporal)   Resp 16   Ht 5' 3" (1.6 m)   Wt 48.1 kg (106 lb)   LMP 07/03/2017 (Exact Date)   SpO2 100%   BMI 18.78 kg/m²       Post vital signs: stable    Level of consciousness: awake, alert  and oriented    Nausea/Vomiting: no nausea/no vomiting    Complications: none    Airway Patency: patent    Respiratory: unassisted    Cardiovascular: stable and blood pressure at baseline    Hydration: euvolemic  "

## 2017-07-18 ENCOUNTER — OFFICE VISIT (OUTPATIENT)
Dept: SURGERY | Facility: CLINIC | Age: 27
End: 2017-07-18
Payer: MEDICAID

## 2017-07-18 VITALS — DIASTOLIC BLOOD PRESSURE: 58 MMHG | SYSTOLIC BLOOD PRESSURE: 100 MMHG | HEART RATE: 60 BPM

## 2017-07-18 DIAGNOSIS — K80.20 GALLSTONES: Primary | ICD-10-CM

## 2017-07-18 PROCEDURE — 99024 POSTOP FOLLOW-UP VISIT: CPT | Mod: ,,, | Performed by: SURGERY

## 2017-07-18 PROCEDURE — 99212 OFFICE O/P EST SF 10 MIN: CPT | Mod: PBBFAC | Performed by: SURGERY

## 2017-07-18 PROCEDURE — 99999 PR PBB SHADOW E&M-EST. PATIENT-LVL II: CPT | Mod: PBBFAC,,, | Performed by: SURGERY

## 2017-07-18 NOTE — PROGRESS NOTES
GENERAL SURGERY CLINIC NOTE    Christin Dukes is a 26 y.o. female with Hx of CP, biliary colic, and cholelithiasis s/p laparoscopic cholecystectomy (7/5/17) who presents to clinic today for follow up. She has done well since discharge. Incisions healing well. No issues. Pain minimal. No fever. Tolerating diet. At baseline per caregiver.      ROS:   Gen: No F/C, unintentional weight loss, night sweats, fatigue  Cardio: No chest pain, palpitations, syncope  Resp: No shortness of breath, cough, wheeze  GI: No abdominal pain, N/V, change in bowel habits, change in stool caliber or color, bleeding per rectum  : No dysuria, hematuria, frequency      PHYSICAL EXAM:  There were no vitals filed for this visit.    General: NAD  Neuro: AAOx4  Cardio: S1 and S2, RRR  Resp: CTAB, breathing even and unlabored  Abd: Soft, ND, NT, incisions healing well, no erythema, no induration, no drainage  Ext: Warm and well perfused      SURGICAL PATHOLOGY:  Gallbladder (7/5/17): Chronic cholecystitis and cholelithiasis.      ASSESSMENT/PLAN:  26 y.o. female with Hx of cholelithiasis and biliary colic s/p laparoscopic cholecystectomy (7/5/17)    - Doing well post-operatively with no apparent complications  - Pathology reviewed with patient and family present in room today  - Continue lifting precautions until 4 weeks post-op  - RTC PRN      Jose Rafael Cummings MD  Surgery Resident, PGY-III  Pager: [phone]  7/18/2017 10:14 AM    ATTENDING ATTESTATION: Patient seen and examined. My examination confirms the resident's findings and I agree with his assessment and plan above.

## 2017-11-12 ENCOUNTER — HOSPITAL ENCOUNTER (EMERGENCY)
Facility: HOSPITAL | Age: 27
Discharge: HOME | End: 2017-11-12
Attending: EMERGENCY MEDICINE
Payer: MEDICAID

## 2017-11-12 VITALS
OXYGEN SATURATION: 100 % | RESPIRATION RATE: 16 BRPM | HEART RATE: 83 BPM | TEMPERATURE: 98 F | DIASTOLIC BLOOD PRESSURE: 71 MMHG | SYSTOLIC BLOOD PRESSURE: 125 MMHG | WEIGHT: 96 LBS | BODY MASS INDEX: 16.39 KG/M2 | HEIGHT: 64 IN

## 2017-11-12 DIAGNOSIS — M79.674 TOE PAIN, RIGHT: Primary | ICD-10-CM

## 2017-11-12 PROCEDURE — 99283 EMERGENCY DEPT VISIT LOW MDM: CPT

## 2017-11-12 PROCEDURE — 99284 EMERGENCY DEPT VISIT MOD MDM: CPT | Mod: ,,, | Performed by: EMERGENCY MEDICINE

## 2017-11-12 RX ORDER — QUETIAPINE FUMARATE 25 MG/1
TABLET, FILM COATED ORAL
COMMUNITY

## 2017-11-12 NOTE — ED TRIAGE NOTES
Thursday she started complaining of right great toe pain and has progressively gotten worse and pt no longer able to put pressure on foot and c/o numbness and tingling - denies trauma

## 2017-11-12 NOTE — ED NOTES
Appearance: Pt awake, alert & oriented to person, place & time. Pt in no acute distress at present time.   Skin: Skin warm, dry & intact. Mucous membranes moist. Skin turgor normal.   Respiratory: Respirations even, non-labored.   Neurologic: Pt moving all extremities without difficulty. Sensation intact.   Peripheral Vascular: All peripheral pulses present.  No swelling noted to right great toe. Tenderness to palpation to plantar side of joint. numnbess and tingling to distal part of toe and down medial side of foot. Good cap refill

## 2017-11-12 NOTE — ED PROVIDER NOTES
Encounter Date: 11/12/2017       History     Chief Complaint   Patient presents with    Toe Pain     r big toe pain and can't recall hurting it     Christin Cousin is a 26 y.o. female with PMH CP presents with right big toe pain fur 2 days.  She is unsure of any injury to the toe, she thinks she may have stubbed it.  Pain is along lateral border mainly on the plantar surface.  It worsens with weight bearing.  She does endorse some numbness to the big toe that is intermittent.  No erythema or warmth to toe, no fevers/chills.  No h/o gout.          Review of patient's allergies indicates:   Allergen Reactions    Shellfish containing products Anaphylaxis    Pcn [penicillins]      Hives     Past Medical History:   Diagnosis Date    Cerebral palsy     GERD (gastroesophageal reflux disease)     Scoliosis      Past Surgical History:   Procedure Laterality Date    CHOLECYSTECTOMY      HIP SURGERY  2000    Patient has surgery to straighten leg bone.     HIP SURGERY      vascular pump      WISDOM TOOTH EXTRACTION Bilateral 2016     Family History   Problem Relation Age of Onset    Diabetes Maternal Grandmother     Heart disease Maternal Grandmother      Social History   Substance Use Topics    Smoking status: Never Smoker    Smokeless tobacco: Never Used    Alcohol use No     Review of Systems   Constitutional: Negative for chills and fever.   HENT: Negative.    Eyes: Negative.    Respiratory: Negative for cough, chest tightness and wheezing.    Cardiovascular: Negative for chest pain and palpitations.   Gastrointestinal: Negative.    Endocrine: Negative.    Genitourinary: Negative.    Musculoskeletal:        Right great toe pain   Skin: Negative for pallor and rash.   Neurological: Positive for numbness (right big toe). Negative for dizziness, tremors, weakness and headaches.   Hematological: Negative.    Psychiatric/Behavioral: Negative.        Physical Exam     Initial Vitals [11/12/17 0822]   BP Pulse Resp  Temp SpO2   (!) 118/57 (!) 114 18 98.5 °F (36.9 °C) 98 %      MAP       77.33         Physical Exam    Nursing note and vitals reviewed.  Constitutional: She appears well-developed and well-nourished. She is not diaphoretic. No distress.   HENT:   Head: Normocephalic and atraumatic.   Eyes: EOM are normal. Pupils are equal, round, and reactive to light.   Neck: Normal range of motion.   Cardiovascular: Normal rate and regular rhythm.   Pulmonary/Chest: Breath sounds normal. No respiratory distress.   Musculoskeletal:   Right Great Toe:  - no deformity  - no erythema or warmth  - ttp to IP joint  - no pain with ROM  - sensation in tact   Neurological: She is alert and oriented to person, place, and time. She has normal strength and normal reflexes.         ED Course   Procedures  Labs Reviewed - No data to display       X-Rays:   Independently Interpreted Readings:   Other Readings:  Foot xr:  No fx, dislocation or bony lesion    Medical Decision Making:   History:   Old Medical Records: I decided to obtain old medical records.  Clinical Tests:   Radiological Study: Ordered and Reviewed       APC / Resident Notes:   Christin Dukes is a 26 y.o. female with right great toe pain.  No h/o gout, erythema, or warmth.  She has no known h/o trauma, though she is unsure of that.  Likely stubbed toe vs fx.  Will obtain xrays and re-evaluate.     Update 1020:  Xrays reviewed and show not evidence of fracture.  Will give hard-sole shoe and discharge home.  Follow up with PCP if symptoms do not resolve.         Attending Attestation:   Physician Attestation Statement for Resident:  As the supervising MD   Physician Attestation Statement: I have personally seen and examined this patient.   I agree with the above history. -:   As the supervising MD I agree with the above PE.   -: Skin intact  No erythema/warmth/edema  Sensation/perfusion intact  Rest of toe/foot nontender   As the supervising MD I agree with the above treatment,  course, plan, and disposition.  I have reviewed and agree with the residents interpretation of the following: x-rays.                    ED Course      Clinical Impression:   The encounter diagnosis was Toe pain, right.    Disposition:   Disposition: Discharged                        Dany Basurto MD  11/12/17 0609

## 2017-12-29 ENCOUNTER — HOSPITAL ENCOUNTER (EMERGENCY)
Facility: HOSPITAL | Age: 27
Discharge: HOME | End: 2017-12-29
Attending: EMERGENCY MEDICINE
Payer: MEDICAID

## 2017-12-29 VITALS
SYSTOLIC BLOOD PRESSURE: 122 MMHG | DIASTOLIC BLOOD PRESSURE: 57 MMHG | OXYGEN SATURATION: 96 % | RESPIRATION RATE: 20 BRPM | WEIGHT: 93 LBS | BODY MASS INDEX: 17.11 KG/M2 | HEIGHT: 62 IN | TEMPERATURE: 98 F | HEART RATE: 82 BPM

## 2017-12-29 DIAGNOSIS — J11.1 FLU: Primary | ICD-10-CM

## 2017-12-29 PROCEDURE — 99283 EMERGENCY DEPT VISIT LOW MDM: CPT

## 2017-12-29 PROCEDURE — 99284 EMERGENCY DEPT VISIT MOD MDM: CPT | Mod: ,,, | Performed by: PHYSICIAN ASSISTANT

## 2017-12-29 RX ORDER — OSELTAMIVIR PHOSPHATE 75 MG/1
75 CAPSULE ORAL 2 TIMES DAILY
Qty: 10 CAPSULE | Refills: 0 | Status: SHIPPED | OUTPATIENT
Start: 2017-12-29 | End: 2018-01-03

## 2017-12-29 NOTE — ED NOTES
Patient identifiers verified and correct for Ms Cousin  C/C: Flu like symptoms  APPEARANCE: awake and alert in NAD.  SKIN: warm, dry and intact. No breakdown or bruising.  MUSCULOSKELETAL: Patient moving all extremities spontaneously, min mvmt BLE, H/O CP,  RESPIRATORY: Denies shortness of breath.Respirations unlabored. Denies cough  CARDIAC: Denies CP, 2+ distal pulses; no peripheral edema  ABDOMEN: S/ND/NT, Denies nausea  : voids spontaneously, denies difficulty  Neurologic: Alert, oriented, speech garbled able to make needs known follows commands equal strength in all extremities; denies numbness/tingling. Denies dizziness Positive weakness, wheelchair bound

## 2017-12-29 NOTE — ED PROVIDER NOTES
Encounter Date: 12/29/2017    SCRIBE #1 NOTE: I, Ishmael Pacheco, am scribing for, and in the presence of,  Dr. West. I have scribed the following portions of the note - the APC attestation.       History     Chief Complaint   Patient presents with    Generalized Body Aches     cold symptoms, sore throat, and body aches x 3-4 days     Patient is a 27-year-old female with past medical history of cerebral palsy who presents to the emergency department due to a 3 day history of generalized fatigue and a one-day history of overall body aches.  Patient states that over the past 3 days she has been feeling more fatigued and having a lack of energy.  Patient states that she has just been wanting to sleep all day.  Patient states that yesterday she developed generalized body aches as well as a sore throat.  Patient denies any fevers, chills, chest pain, shortness of breath, or any other complaints.          Review of patient's allergies indicates:   Allergen Reactions    Shellfish containing products Anaphylaxis    Pcn [penicillins]      Hives     Past Medical History:   Diagnosis Date    Cerebral palsy     GERD (gastroesophageal reflux disease)     Scoliosis      Past Surgical History:   Procedure Laterality Date    CHOLECYSTECTOMY      HIP SURGERY  2000    Patient has surgery to straighten leg bone.     HIP SURGERY      vascular pump      WISDOM TOOTH EXTRACTION Bilateral 2016     Family History   Problem Relation Age of Onset    Diabetes Maternal Grandmother     Heart disease Maternal Grandmother      Social History   Substance Use Topics    Smoking status: Never Smoker    Smokeless tobacco: Never Used    Alcohol use No     Review of Systems   Constitutional: Positive for fatigue. Negative for activity change, appetite change, diaphoresis and fever.   HENT: Positive for sore throat. Negative for congestion, dental problem, drooling, ear pain, facial swelling and trouble swallowing.    Eyes: Negative for  pain, discharge and visual disturbance.   Respiratory: Negative for apnea, cough, chest tightness and shortness of breath.    Cardiovascular: Negative for chest pain and palpitations.   Gastrointestinal: Negative for abdominal distention, anal bleeding, blood in stool, diarrhea, nausea and vomiting.   Endocrine: Negative for cold intolerance and polydipsia.   Genitourinary: Negative for decreased urine volume, difficulty urinating, enuresis, frequency and hematuria.   Musculoskeletal: Negative for arthralgias, gait problem, myalgias and neck stiffness.   Skin: Negative for color change and pallor.   Allergic/Immunologic: Negative for environmental allergies.   Neurological: Negative for dizziness, syncope, numbness and headaches.   Psychiatric/Behavioral: Negative for agitation, confusion and dysphoric mood.       Physical Exam     Initial Vitals [12/29/17 1228]   BP Pulse Resp Temp SpO2   (!) 122/57 82 20 98.3 °F (36.8 °C) 96 %      MAP       78.67         Physical Exam    Nursing note and vitals reviewed.  Constitutional: She appears well-developed and well-nourished. She is not diaphoretic. No distress.   HENT:   Head: Normocephalic and atraumatic.   Neck: Normal range of motion. Neck supple.   Cardiovascular: Normal rate, regular rhythm and normal heart sounds. Exam reveals no gallop and no friction rub.    No murmur heard.  Pulmonary/Chest: Breath sounds normal. She has no wheezes. She has no rhonchi. She has no rales.   Abdominal: Soft. Bowel sounds are normal. There is no tenderness. There is no rebound and no guarding.   Musculoskeletal: Normal range of motion.   Neurological: She is alert and oriented to person, place, and time.   Skin: Skin is warm and dry. No rash noted. No erythema.   Psychiatric: She has a normal mood and affect.         ED Course   Procedures  Labs Reviewed - No data to display                APC / Resident Notes:   Patient is a 27-year-old female with past medical history of cerebral  palsy who presents to the emergency department due to a 3 day history of generalized fatigue and a one-day history of overall body aches.  Physical exam reveals female in a wheelchair in no acute distress.  Heart regular rate and rhythm.  Lungs clear to auscultation bilaterally.  Abdomen soft nontender nondistended.  Will give patient Tamiflu for presumed flu.  Plan treatment as attending physician and she is agreeable.       Scribe Attestation:   Scribe #1: I performed the above scribed service and the documentation accurately describes the services I performed. I attest to the accuracy of the note.    Attending Attestation:     Physician Attestation Statement for NP/PA:   I discussed this assessment and plan of this patient with the NP/PA, but I did not personally examine the patient. The face to face encounter was performed by the NP/PA.    Other NP/PA Attestation Additions:    History of Present Illness: 27 y.o. woman with history of cerebral palsy complaining of cough and body aches. She did get the flu shot     Medical Decision Making: This is likely influenza. Will treat with tamiflu                  ED Course      Clinical Impression:   The encounter diagnosis was Flu.    Disposition:   Disposition: Discharged  Condition: Stable                        Rima Jolley PA-C  12/29/17 [id number]

## 2018-01-25 ENCOUNTER — HOSPITAL ENCOUNTER (EMERGENCY)
Facility: HOSPITAL | Age: 28
Discharge: HOME | End: 2018-01-26
Attending: EMERGENCY MEDICINE
Payer: MEDICAID

## 2018-01-25 VITALS
HEIGHT: 62 IN | TEMPERATURE: 98 F | HEART RATE: 84 BPM | WEIGHT: 92 LBS | RESPIRATION RATE: 17 BRPM | OXYGEN SATURATION: 96 % | SYSTOLIC BLOOD PRESSURE: 130 MMHG | DIASTOLIC BLOOD PRESSURE: 73 MMHG | BODY MASS INDEX: 16.93 KG/M2

## 2018-01-25 DIAGNOSIS — K59.00 CONSTIPATION, UNSPECIFIED CONSTIPATION TYPE: Primary | ICD-10-CM

## 2018-01-25 DIAGNOSIS — R10.13 EPIGASTRIC PAIN: ICD-10-CM

## 2018-01-25 PROCEDURE — 99284 EMERGENCY DEPT VISIT MOD MDM: CPT | Mod: ,,, | Performed by: EMERGENCY MEDICINE

## 2018-01-25 PROCEDURE — 96374 THER/PROPH/DIAG INJ IV PUSH: CPT

## 2018-01-25 PROCEDURE — 96375 TX/PRO/DX INJ NEW DRUG ADDON: CPT

## 2018-01-25 PROCEDURE — 99284 EMERGENCY DEPT VISIT MOD MDM: CPT | Mod: 25

## 2018-01-26 LAB
ALBUMIN SERPL BCP-MCNC: 3.7 G/DL
ALP SERPL-CCNC: 55 U/L
ALT SERPL W/O P-5'-P-CCNC: 17 U/L
ANION GAP SERPL CALC-SCNC: 8 MMOL/L
AST SERPL-CCNC: 21 U/L
BASOPHILS # BLD AUTO: 0.1 K/UL
BASOPHILS NFR BLD: 1.3 %
BILIRUB SERPL-MCNC: 0.3 MG/DL
BUN SERPL-MCNC: 12 MG/DL
CALCIUM SERPL-MCNC: 9.4 MG/DL
CHLORIDE SERPL-SCNC: 104 MMOL/L
CO2 SERPL-SCNC: 27 MMOL/L
CREAT SERPL-MCNC: 0.9 MG/DL
DIFFERENTIAL METHOD: ABNORMAL
EOSINOPHIL # BLD AUTO: 0.6 K/UL
EOSINOPHIL NFR BLD: 8.2 %
ERYTHROCYTE [DISTWIDTH] IN BLOOD BY AUTOMATED COUNT: 12.9 %
EST. GFR  (AFRICAN AMERICAN): >60 ML/MIN/1.73 M^2
EST. GFR  (NON AFRICAN AMERICAN): >60 ML/MIN/1.73 M^2
GLUCOSE SERPL-MCNC: 89 MG/DL
HCT VFR BLD AUTO: 35.8 %
HGB BLD-MCNC: 12.2 G/DL
IMM GRANULOCYTES # BLD AUTO: 0.01 K/UL
IMM GRANULOCYTES NFR BLD AUTO: 0.1 %
LIPASE SERPL-CCNC: 16 U/L
LYMPHOCYTES # BLD AUTO: 4.3 K/UL
LYMPHOCYTES NFR BLD: 56 %
MCH RBC QN AUTO: 30 PG
MCHC RBC AUTO-ENTMCNC: 34.1 G/DL
MCV RBC AUTO: 88 FL
MONOCYTES # BLD AUTO: 1 K/UL
MONOCYTES NFR BLD: 13.5 %
NEUTROPHILS # BLD AUTO: 1.6 K/UL
NEUTROPHILS NFR BLD: 20.9 %
NRBC BLD-RTO: 0 /100 WBC
PLATELET # BLD AUTO: 239 K/UL
PMV BLD AUTO: 10.5 FL
POTASSIUM SERPL-SCNC: 4.1 MMOL/L
PROT SERPL-MCNC: 7.4 G/DL
RBC # BLD AUTO: 4.07 M/UL
SODIUM SERPL-SCNC: 139 MMOL/L
WBC # BLD AUTO: 7.64 K/UL

## 2018-01-26 PROCEDURE — 85025 COMPLETE CBC W/AUTO DIFF WBC: CPT

## 2018-01-26 PROCEDURE — S0028 INJECTION, FAMOTIDINE, 20 MG: HCPCS | Performed by: EMERGENCY MEDICINE

## 2018-01-26 PROCEDURE — 80053 COMPREHEN METABOLIC PANEL: CPT

## 2018-01-26 PROCEDURE — 83690 ASSAY OF LIPASE: CPT

## 2018-01-26 PROCEDURE — 25000003 PHARM REV CODE 250: Performed by: EMERGENCY MEDICINE

## 2018-01-26 PROCEDURE — 63600175 PHARM REV CODE 636 W HCPCS: Performed by: EMERGENCY MEDICINE

## 2018-01-26 RX ORDER — POLYETHYLENE GLYCOL 3350 17 G/17G
17 POWDER, FOR SOLUTION ORAL DAILY
Qty: 850 G | Refills: 0 | Status: SHIPPED | OUTPATIENT
Start: 2018-01-26

## 2018-01-26 RX ORDER — FAMOTIDINE 10 MG/ML
20 INJECTION INTRAVENOUS
Status: COMPLETED | OUTPATIENT
Start: 2018-01-26 | End: 2018-01-26

## 2018-01-26 RX ORDER — PANTOPRAZOLE SODIUM 20 MG/1
20 TABLET, DELAYED RELEASE ORAL DAILY
Qty: 30 TABLET | Refills: 0 | Status: SHIPPED | OUTPATIENT
Start: 2018-01-26 | End: 2019-01-26

## 2018-01-26 RX ORDER — ONDANSETRON 2 MG/ML
8 INJECTION INTRAMUSCULAR; INTRAVENOUS
Status: COMPLETED | OUTPATIENT
Start: 2018-01-26 | End: 2018-01-26

## 2018-01-26 RX ADMIN — ONDANSETRON 8 MG: 2 INJECTION INTRAMUSCULAR; INTRAVENOUS at 12:01

## 2018-01-26 RX ADMIN — FAMOTIDINE 20 MG: 10 INJECTION, SOLUTION INTRAVENOUS at 12:01

## 2022-02-22 NOTE — ED TRIAGE NOTES
Patient and family states gen body aches, headache, sore throat, runny nose, no cough, unknown fever  
Abnormal WBC: < 4,000 OR > 12,000

## 2023-03-04 ENCOUNTER — HOSPITAL ENCOUNTER (EMERGENCY)
Facility: HOSPITAL | Age: 33
Discharge: HOME | End: 2023-03-05
Attending: EMERGENCY MEDICINE
Payer: MEDICAID

## 2023-03-04 VITALS
TEMPERATURE: 98 F | DIASTOLIC BLOOD PRESSURE: 61 MMHG | OXYGEN SATURATION: 100 % | SYSTOLIC BLOOD PRESSURE: 101 MMHG | HEART RATE: 93 BPM | RESPIRATION RATE: 21 BRPM

## 2023-03-04 DIAGNOSIS — R10.11 RUQ PAIN: ICD-10-CM

## 2023-03-04 DIAGNOSIS — R19.00 PELVIC MASS: Primary | ICD-10-CM

## 2023-03-04 DIAGNOSIS — R10.9 ABDOMINAL PAIN: ICD-10-CM

## 2023-03-04 LAB
ALBUMIN SERPL BCP-MCNC: 3.9 G/DL (ref 3.5–5.2)
ALP SERPL-CCNC: 54 U/L (ref 55–135)
ALT SERPL W/O P-5'-P-CCNC: <5 U/L (ref 10–44)
ANION GAP SERPL CALC-SCNC: 8 MMOL/L (ref 8–16)
AST SERPL-CCNC: 15 U/L (ref 10–40)
B-HCG UR QL: NEGATIVE
BACTERIA #/AREA URNS AUTO: ABNORMAL /HPF
BASOPHILS # BLD AUTO: 0.11 K/UL (ref 0–0.2)
BASOPHILS NFR BLD: 1.1 % (ref 0–1.9)
BILIRUB SERPL-MCNC: 0.2 MG/DL (ref 0.1–1)
BILIRUB UR QL STRIP: NEGATIVE
BUN SERPL-MCNC: 11 MG/DL (ref 6–20)
CALCIUM SERPL-MCNC: 9 MG/DL (ref 8.7–10.5)
CHLORIDE SERPL-SCNC: 109 MMOL/L (ref 95–110)
CLARITY UR REFRACT.AUTO: CLEAR
CO2 SERPL-SCNC: 25 MMOL/L (ref 23–29)
COLOR UR AUTO: YELLOW
CREAT SERPL-MCNC: 0.7 MG/DL (ref 0.5–1.4)
CTP QC/QA: YES
DIFFERENTIAL METHOD: ABNORMAL
EOSINOPHIL # BLD AUTO: 0.7 K/UL (ref 0–0.5)
EOSINOPHIL NFR BLD: 6.9 % (ref 0–8)
ERYTHROCYTE [DISTWIDTH] IN BLOOD BY AUTOMATED COUNT: 15.3 % (ref 11.5–14.5)
EST. GFR  (NO RACE VARIABLE): >60 ML/MIN/1.73 M^2
GLUCOSE SERPL-MCNC: 89 MG/DL (ref 70–110)
GLUCOSE UR QL STRIP: NEGATIVE
HCT VFR BLD AUTO: 32.5 % (ref 37–48.5)
HGB BLD-MCNC: 10.5 G/DL (ref 12–16)
HGB UR QL STRIP: ABNORMAL
IMM GRANULOCYTES # BLD AUTO: 0.02 K/UL (ref 0–0.04)
IMM GRANULOCYTES NFR BLD AUTO: 0.2 % (ref 0–0.5)
KETONES UR QL STRIP: ABNORMAL
LEUKOCYTE ESTERASE UR QL STRIP: ABNORMAL
LIPASE SERPL-CCNC: 24 U/L (ref 4–60)
LYMPHOCYTES # BLD AUTO: 3.9 K/UL (ref 1–4.8)
LYMPHOCYTES NFR BLD: 39.6 % (ref 18–48)
MCH RBC QN AUTO: 27.5 PG (ref 27–31)
MCHC RBC AUTO-ENTMCNC: 32.3 G/DL (ref 32–36)
MCV RBC AUTO: 85 FL (ref 82–98)
MICROSCOPIC COMMENT: ABNORMAL
MONOCYTES # BLD AUTO: 1 K/UL (ref 0.3–1)
MONOCYTES NFR BLD: 10.4 % (ref 4–15)
NEUTROPHILS # BLD AUTO: 4.1 K/UL (ref 1.8–7.7)
NEUTROPHILS NFR BLD: 41.8 % (ref 38–73)
NITRITE UR QL STRIP: NEGATIVE
NRBC BLD-RTO: 0 /100 WBC
PH UR STRIP: 6 [PH] (ref 5–8)
PLATELET # BLD AUTO: 308 K/UL (ref 150–450)
PMV BLD AUTO: 10.5 FL (ref 9.2–12.9)
POTASSIUM SERPL-SCNC: 3.9 MMOL/L (ref 3.5–5.1)
PROT SERPL-MCNC: 7.7 G/DL (ref 6–8.4)
PROT UR QL STRIP: ABNORMAL
RBC # BLD AUTO: 3.82 M/UL (ref 4–5.4)
RBC #/AREA URNS AUTO: >100 /HPF (ref 0–4)
SODIUM SERPL-SCNC: 142 MMOL/L (ref 136–145)
SP GR UR STRIP: >1.03 (ref 1–1.03)
SQUAMOUS #/AREA URNS AUTO: 3 /HPF
URN SPEC COLLECT METH UR: ABNORMAL
WBC # BLD AUTO: 9.82 K/UL (ref 3.9–12.7)
WBC #/AREA URNS AUTO: 6 /HPF (ref 0–5)

## 2023-03-04 PROCEDURE — 99285 EMERGENCY DEPT VISIT HI MDM: CPT | Mod: 25

## 2023-03-04 PROCEDURE — 25500020 PHARM REV CODE 255: Performed by: EMERGENCY MEDICINE

## 2023-03-04 PROCEDURE — 93005 ELECTROCARDIOGRAM TRACING: CPT

## 2023-03-04 PROCEDURE — 63600175 PHARM REV CODE 636 W HCPCS: Performed by: EMERGENCY MEDICINE

## 2023-03-04 PROCEDURE — 99284 EMERGENCY DEPT VISIT MOD MDM: CPT | Mod: ,,, | Performed by: EMERGENCY MEDICINE

## 2023-03-04 PROCEDURE — 83690 ASSAY OF LIPASE: CPT | Performed by: EMERGENCY MEDICINE

## 2023-03-04 PROCEDURE — 96361 HYDRATE IV INFUSION ADD-ON: CPT

## 2023-03-04 PROCEDURE — 80053 COMPREHEN METABOLIC PANEL: CPT | Performed by: EMERGENCY MEDICINE

## 2023-03-04 PROCEDURE — 81025 URINE PREGNANCY TEST: CPT | Performed by: EMERGENCY MEDICINE

## 2023-03-04 PROCEDURE — 96374 THER/PROPH/DIAG INJ IV PUSH: CPT

## 2023-03-04 PROCEDURE — 81001 URINALYSIS AUTO W/SCOPE: CPT | Performed by: EMERGENCY MEDICINE

## 2023-03-04 PROCEDURE — 93010 ELECTROCARDIOGRAM REPORT: CPT | Mod: ,,, | Performed by: INTERNAL MEDICINE

## 2023-03-04 PROCEDURE — 93010 EKG 12-LEAD: ICD-10-PCS | Mod: ,,, | Performed by: INTERNAL MEDICINE

## 2023-03-04 PROCEDURE — 25000003 PHARM REV CODE 250: Performed by: EMERGENCY MEDICINE

## 2023-03-04 PROCEDURE — 85025 COMPLETE CBC W/AUTO DIFF WBC: CPT | Performed by: EMERGENCY MEDICINE

## 2023-03-04 PROCEDURE — 99284 PR EMERGENCY DEPT VISIT,LEVEL IV: ICD-10-PCS | Mod: ,,, | Performed by: EMERGENCY MEDICINE

## 2023-03-04 RX ORDER — ONDANSETRON 2 MG/ML
4 INJECTION INTRAMUSCULAR; INTRAVENOUS
Status: COMPLETED | OUTPATIENT
Start: 2023-03-04 | End: 2023-03-04

## 2023-03-04 RX ORDER — ACETAMINOPHEN 500 MG
1000 TABLET ORAL
Status: DISCONTINUED | OUTPATIENT
Start: 2023-03-04 | End: 2023-03-05 | Stop reason: HOSPADM

## 2023-03-04 RX ADMIN — SODIUM CHLORIDE 1000 ML: 9 INJECTION, SOLUTION INTRAVENOUS at 07:03

## 2023-03-04 RX ADMIN — IOHEXOL 75 ML: 350 INJECTION, SOLUTION INTRAVENOUS at 10:03

## 2023-03-04 RX ADMIN — ONDANSETRON 4 MG: 2 INJECTION INTRAMUSCULAR; INTRAVENOUS at 09:03

## 2023-03-05 NOTE — DISCHARGE INSTRUCTIONS
Imaging Results              US Pelvis Complete Non OB (Final result)  Result time 03/05/23 01:56:44      Final result by Blake Brooks MD (03/05/23 01:56:44)                   Impression:      Nondiagnostic examination secondary to overlying bowel gas and nondistention of the urinary bladder.  Recommend outpatient gynecology follow-up to consider outpatient transvaginal ultrasound and/or pelvic MRI for additional characterization of abnormal findings on recent CT, as clinically indicated.    Electronically signed by resident: Bennie Casillas  Date:    03/05/2023  Time:    01:47    Electronically signed by: Blake Brooks MD  Date:    03/05/2023  Time:    01:56               Narrative:    EXAMINATION:  US PELVIS COMPLETE NON OB    CLINICAL HISTORY:  Pelvic masses;    TECHNIQUE:  Transabdominal sonography of the pelvis was performed to evaluate the uterus and ovaries.  Transvaginal ultrasound was deferred secondary to reported history of cerebral palsy/developmental delay.    COMPARISON:  CT abdomen pelvis 03/04/2023.    FINDINGS:  The uterus, ovaries and other pelvic structures are not visualized secondary to overlying bowel gas.                                        CT Abdomen Pelvis With Contrast (Final result)  Result time 03/04/23 22:44:24      Final result by Jonathan Armendariz MD (03/04/23 22:44:24)                   Impression:      There are multiple pelvic mass lesions bilaterally as discussed above.  Malignancy would be in the differential, clinical and historical correlation is needed, pelvic ultrasound follow-up is recommended.    The appendix appears prominent, however there is no abnormal wall thickening or periappendiceal inflammatory change and the lumen demonstrates appearance of air and stool like material, this may relate to variant anatomic configuration, as discussed above.    Mild wall thickening along the distal stomach, nonspecific however can be seen with gastritis or gastric ulcer  disease or infiltrating process, clinical and historical correlation is needed to determine need for additional follow-up.    Mild prominence of the colon with air and stool without inflammatory or obstructive pattern.    Additional findings as above.    This report was flagged in Epic as abnormal.      Electronically signed by: Jonathan Armendariz  Date:    03/04/2023  Time:    22:44               Narrative:    EXAMINATION:  CT ABDOMEN PELVIS WITH CONTRAST    CLINICAL HISTORY:  RLQ abdominal pain (Age >= 14y);    TECHNIQUE:  Low dose axial images, sagittal and coronal reformations were obtained from the lung bases to the pubic symphysis following the IV administration of 75 mL of Omnipaque 350 oral contrast was not utilized.  Single phase postcontrast CT examination of the abdomen and pelvis is submitted.  Artifact including mild motion artifact is present diminishing the sensitivity of the exam.    COMPARISON:  CT examination of the abdomen and pelvis without contrast August 16, 2011    FINDINGS:  The lung bases demonstrate mild motion artifact and appearance of mild diminished depth of inspiration.  The stomach demonstrates nonspecific appearance of fluid and air within the gastric lumen, there is mild wall thickening along the distal stomach and pylorus, nonspecific although can be seen with gastritis or gastric ulcer disease, clinical and historical correlation is needed to determine need for additional follow-up.    The patient appears status post cholecystectomy.  When accounting for limitations of the examination, there is no evidence for acute process of the liver, pancreas, spleen or adrenal glands.  There is no evidence for hydronephrosis or obstructive uropathy or perinephric inflammatory change bilaterally.  The ureters are difficult to delineate along their course to the urinary bladder however there is no evidence for hydroureter or ureteral calculus bilaterally.    There is a finding that may relate to  navel piercing jewelry noted.  The abdominal aorta appears normal in caliber, demonstrates appropriate opacification.  The urinary bladder appears decompressed, not well delineated and not well evaluated on this examination.    The uterus appears retroverted and retroflexed, diminished attenuation along the endometrial canal is nonspecific, and may relate to timing of imaging after contrast administration.    Projected just to the right of the ureters as seen on axial image 112, there is a complex structure measuring approximately 58 Hounsfield units, measuring approximately 1.9 by 1.6 cm on axial imaging, and approximately 2.4 cm on coronal reconstruction image 42.  This may relate to a complex lesion of the right ovary.    Just anterior to the uterus, as seen on axial image 113, difficult to separate from the anterior margin of the uterus, there is a complex structure measuring approximately 3 cm by 3.9 cm on axial imaging, measuring approximately 3.2 cm in the craniocaudal dimension, with an area of relative central diminished attenuation measuring up to approximately 2.4 cm on axial imaging and measuring approximately 54 Hounsfield units.  This may relate to a mass lesion associated with the right adnexa extending anteriorly, could arise from the uterus or alternatively extra uterine pelvic mass lesion.    As seen on axial image 120 extending to the left side of the uterus there is a heterogeneous lesion measuring approximately 2.8 cm by 2.1 cm on axial imaging, centrally heterogeneous hypodense measuring approximately 41 Hounsfield units, this measures approximately 2.8 cm in craniocaudal dimension on coronal reconstruction image 57.    In addition posteriorly on the left as seen on axial image 122 there is a complex lesion measuring approximately 3.5 x 3.3 cm on axial imaging, and measuring approximately 3.5 cm in the craniocaudal dimension on coronal image 29.  These left-sided lesions may arise from the left  adnexa or represent extra adnexal lesions.  There is also a left-sided lesion on axial image 126 measuring approximately 1.8 x 1.8 cm that may relate to a complex cystic lesion with mild thick-walled appearance, measuring approximately 2.2 cm on coronal image 43.    There is an additional mass lesion consistent with a soft tissue mass lesion along the right paramidline posterior pelvis anterior to the sacrum, appearing posterior to the uterus as seen on axial image 116 measuring approximately 3.1 x 3.2 cm on axial imaging, measuring approximately 3.3 cm in the craniocaudal dimension on coronal image 30.    There is no evidence for small bowel obstructive process.  There is a blind-ending structure arising from the cecum that is thought to represent the appendix, as seen extending posteriorly on axial images 111 through 118.  This does appear prominent in caliber, measuring up to approximately 9.4 mm in diameter, however does not demonstrate a thick-walled appearance, the lumen appears filled with stool like material and air, there is no abnormal surrounding edema or inflammatory change, and the appearance is therefore not thought to represent acute appendicitis, this may relate to variant anatomic configuration of the appendix, distended with ingested material.  The appendix is not well delineated on the prior noncontrast CT examination.  There is mild prominence of the colon with air and stool without inflammatory or obstructive pattern.    There is no evidence for free intraperitoneal air or significant abnormal free fluid of the abdomen or pelvis.    There is scoliotic curvature of the spine noted.  There is asymmetric sclerotic change involving the right sacroiliac joint, this may relate to sacroiliitis.  Chronic changes of the proximal right femur are noted, postoperative change of the left femur noted.  Overall the osseous structures demonstrate chronic change.                                       X-Ray Chest  1 View (Final result)  Result time 03/04/23 19:51:07      Final result by Alexei Hood MD (03/04/23 19:51:07)                   Impression:      1. Increased interstitial attenuation projects over the right upper lung zone.  This is suspected to be on the basis of accentuation from overlying soft tissue however given positioning, correlation is advised.  Consider PA and lateral radiograph for further evaluation as warranted.      Electronically signed by: Alexei Hood MD  Date:    03/04/2023  Time:    19:51               Narrative:    EXAMINATION:  XR CHEST 1 VIEW    CLINICAL HISTORY:  Right upper quadrant pain    TECHNIQUE:  Single frontal view of the chest was performed.    COMPARISON:  09/05/2010    FINDINGS:  The cardiomediastinal silhouette is not enlarged.  There is no pleural effusion.  The trachea is midline.  The lungs are symmetrically expanded bilaterally with increased interstitial attenuation projected over the right upper lung zone, accentuated by overlying habitus..  No large focal consolidation seen.  There is no pneumothorax.  The osseous structures are unremarkable.

## 2023-03-05 NOTE — PROVIDER PROGRESS NOTES - EMERGENCY DEPT.
ED Resident HAND-OFF NOTE:  11:52 PM 3/4/2023  Christin Dukes is a 32 y.o. female who presented to the ED on 3/4/2023 and has been managed by  and Dr. Rosario, who reports patient C/O abdominal pain. I assumed care of patient from off-going ED physician team at 11:52 PM pending CT.    On my evaluation, Christin Dukes appears well and is hemodynamically stable. Thus far, Christin Dukes has received:  Medications   acetaminophen tablet 1,000 mg (has no administration in time range)   sodium chloride 0.9% bolus 1,000 mL 1,000 mL (0 mLs Intravenous Stopped 3/4/23 2116)   ondansetron injection 4 mg (4 mg Intravenous Given 3/4/23 2152)   iohexoL (OMNIPAQUE 350) injection 75 mL (75 mLs Intravenous Given 3/4/23 2215)       On my exam, I appreciate:  /61   Pulse 93   Temp 97.7 °F (36.5 °C)   Resp (!) 21   SpO2 100%     ED Course as of 03/05/23 0326   Sat Mar 04, 2023   2111 CBC auto differential(!)  No leukocytosis, anemia [NC]   2149 Comprehensive metabolic panel(!)  No electrolyte derangement, normal renal function [NC]   Sun Mar 05, 2023   0023 Urinalysis, Reflex to Urine Culture Urine, Clean Catch(!)  No UTI [NC]   0023 Sign out given to incoming team, pending CT abd and final disposition  [NC]      ED Course User Index  [NC] Stevo Downing MD        Additional ED course:  11:52 PM  CT abdomen pelvis shows multiple masses in the pelvis, which is a new finding.  Discussed results with patient and family, US ordered to evaluate further.  Additional analgesia ordered    3:06 AM  Ultra sound overall nondiagnostic.  Patient's symptoms have improved.  Discussed results with family and patient.  Will discharge with close gynecology follow-up, return precautions were discussed    Disposition:  Discharged  I have discussed and counseled Christin Dukes regarding exam, results, diagnosis, treatment, and plan.  ______________________  Faisal Prince MD   Emergency Medicine Resident  3/4/2023

## 2023-03-05 NOTE — ED PROVIDER NOTES
Encounter Date: 3/4/2023       History     Chief Complaint   Patient presents with    Abdominal Pain     Pt arrives c/o right sided abdominal pain that radiates to back and right shoulder. Also c/o knots on right arm.     32-year-old female, with history of cerebral palsy, GERD, scoliosis, presents to the ED for right-sided abdominal pain.  Patient reports that she has pain on her right side, she had difficulty to communicate, or expressed her concern.  History was taken from her caretaker.  She stated patient was complained about right upper quadrant pain yesterday, then she thinks the pain growing to her right flank and right shoulder.  States that patient complained about suprapubic pain sometimes last week also. Denies fever, chill, vomiting, shortness of breath, dysuria, constipation or diarrhea.  Reports history of cholecystectomy.      Review of patient's allergies indicates:   Allergen Reactions    Shellfish containing products Anaphylaxis    Pcn [penicillins]      Hives     Past Medical History:   Diagnosis Date    Cerebral palsy     GERD (gastroesophageal reflux disease)     Scoliosis      Past Surgical History:   Procedure Laterality Date    CHOLECYSTECTOMY      HIP SURGERY  2000    Patient has surgery to straighten leg bone.     HIP SURGERY      vascular pump      WISDOM TOOTH EXTRACTION Bilateral 2016     Family History   Problem Relation Age of Onset    Diabetes Maternal Grandmother     Heart disease Maternal Grandmother      Social History     Tobacco Use    Smoking status: Never    Smokeless tobacco: Never   Substance Use Topics    Alcohol use: No    Drug use: No     Review of Systems   Gastrointestinal:  Positive for abdominal pain (RUQ).     Physical Exam     Initial Vitals [03/04/23 1901]   BP Pulse Resp Temp SpO2   (!) 180/69 92 20 97.7 °F (36.5 °C) (!) 92 %      MAP       --         Physical Exam    Nursing note and vitals reviewed.  Constitutional: She appears well-developed. No distress.    HENT:   Head: Normocephalic and atraumatic.   Mouth/Throat: Oropharynx is clear and moist.   Eyes: Conjunctivae and EOM are normal.   Neck: No JVD present.   Normal range of motion.  Cardiovascular:  Normal rate, regular rhythm, normal heart sounds and intact distal pulses.           Pulmonary/Chest: Breath sounds normal. No respiratory distress.   Abdominal: Abdomen is soft. She exhibits no distension and no mass. There is abdominal tenderness (RUQ and RLQ). There is no guarding.   Musculoskeletal:         General: No tenderness or edema.      Cervical back: Normal range of motion.     Neurological: She is alert and oriented to person, place, and time. She has normal strength.   Skin: Skin is warm and dry. Capillary refill takes less than 2 seconds.       ED Course   Procedures  Labs Reviewed   CBC W/ AUTO DIFFERENTIAL - Abnormal; Notable for the following components:       Result Value    RBC 3.82 (*)     Hemoglobin 10.5 (*)     Hematocrit 32.5 (*)     RDW 15.3 (*)     Eos # 0.7 (*)     All other components within normal limits   COMPREHENSIVE METABOLIC PANEL - Abnormal; Notable for the following components:    Alkaline Phosphatase 54 (*)     ALT <5 (*)     All other components within normal limits   URINALYSIS, REFLEX TO URINE CULTURE - Abnormal; Notable for the following components:    Specific Gravity, UA >1.030 (*)     Protein, UA Trace (*)     Ketones, UA Trace (*)     Occult Blood UA 3+ (*)     Leukocytes, UA 1+ (*)     All other components within normal limits    Narrative:     Specimen Source->Urine   URINALYSIS MICROSCOPIC - Abnormal; Notable for the following components:    RBC, UA >100 (*)     WBC, UA 6 (*)     All other components within normal limits    Narrative:     Specimen Source->Urine   LIPASE   POCT URINE PREGNANCY     EKG Readings: (Independently Interpreted)   Initial Reading: No STEMI. Rhythm: Normal Sinus Rhythm. Heart Rate: 99. Ectopy: No Ectopy. Conduction: Normal. ST Segments: Normal  ST Segments. T Waves: Normal. Axis: Normal. Clinical Impression: Normal Sinus Rhythm     Imaging Results               CT Abdomen Pelvis With Contrast (Final result)  Result time 03/04/23 22:44:24      Final result by Jonathan Armendariz MD (03/04/23 22:44:24)                   Impression:      There are multiple pelvic mass lesions bilaterally as discussed above.  Malignancy would be in the differential, clinical and historical correlation is needed, pelvic ultrasound follow-up is recommended.    The appendix appears prominent, however there is no abnormal wall thickening or periappendiceal inflammatory change and the lumen demonstrates appearance of air and stool like material, this may relate to variant anatomic configuration, as discussed above.    Mild wall thickening along the distal stomach, nonspecific however can be seen with gastritis or gastric ulcer disease or infiltrating process, clinical and historical correlation is needed to determine need for additional follow-up.    Mild prominence of the colon with air and stool without inflammatory or obstructive pattern.    Additional findings as above.    This report was flagged in Epic as abnormal.      Electronically signed by: Jonathan Armendariz  Date:    03/04/2023  Time:    22:44               Narrative:    EXAMINATION:  CT ABDOMEN PELVIS WITH CONTRAST    CLINICAL HISTORY:  RLQ abdominal pain (Age >= 14y);    TECHNIQUE:  Low dose axial images, sagittal and coronal reformations were obtained from the lung bases to the pubic symphysis following the IV administration of 75 mL of Omnipaque 350 oral contrast was not utilized.  Single phase postcontrast CT examination of the abdomen and pelvis is submitted.  Artifact including mild motion artifact is present diminishing the sensitivity of the exam.    COMPARISON:  CT examination of the abdomen and pelvis without contrast August 16, 2011    FINDINGS:  The lung bases demonstrate mild motion artifact and appearance of  mild diminished depth of inspiration.  The stomach demonstrates nonspecific appearance of fluid and air within the gastric lumen, there is mild wall thickening along the distal stomach and pylorus, nonspecific although can be seen with gastritis or gastric ulcer disease, clinical and historical correlation is needed to determine need for additional follow-up.    The patient appears status post cholecystectomy.  When accounting for limitations of the examination, there is no evidence for acute process of the liver, pancreas, spleen or adrenal glands.  There is no evidence for hydronephrosis or obstructive uropathy or perinephric inflammatory change bilaterally.  The ureters are difficult to delineate along their course to the urinary bladder however there is no evidence for hydroureter or ureteral calculus bilaterally.    There is a finding that may relate to navel piercing jewelry noted.  The abdominal aorta appears normal in caliber, demonstrates appropriate opacification.  The urinary bladder appears decompressed, not well delineated and not well evaluated on this examination.    The uterus appears retroverted and retroflexed, diminished attenuation along the endometrial canal is nonspecific, and may relate to timing of imaging after contrast administration.    Projected just to the right of the ureters as seen on axial image 112, there is a complex structure measuring approximately 58 Hounsfield units, measuring approximately 1.9 by 1.6 cm on axial imaging, and approximately 2.4 cm on coronal reconstruction image 42.  This may relate to a complex lesion of the right ovary.    Just anterior to the uterus, as seen on axial image 113, difficult to separate from the anterior margin of the uterus, there is a complex structure measuring approximately 3 cm by 3.9 cm on axial imaging, measuring approximately 3.2 cm in the craniocaudal dimension, with an area of relative central diminished attenuation measuring up to  approximately 2.4 cm on axial imaging and measuring approximately 54 Hounsfield units.  This may relate to a mass lesion associated with the right adnexa extending anteriorly, could arise from the uterus or alternatively extra uterine pelvic mass lesion.    As seen on axial image 120 extending to the left side of the uterus there is a heterogeneous lesion measuring approximately 2.8 cm by 2.1 cm on axial imaging, centrally heterogeneous hypodense measuring approximately 41 Hounsfield units, this measures approximately 2.8 cm in craniocaudal dimension on coronal reconstruction image 57.    In addition posteriorly on the left as seen on axial image 122 there is a complex lesion measuring approximately 3.5 x 3.3 cm on axial imaging, and measuring approximately 3.5 cm in the craniocaudal dimension on coronal image 29.  These left-sided lesions may arise from the left adnexa or represent extra adnexal lesions.  There is also a left-sided lesion on axial image 126 measuring approximately 1.8 x 1.8 cm that may relate to a complex cystic lesion with mild thick-walled appearance, measuring approximately 2.2 cm on coronal image 43.    There is an additional mass lesion consistent with a soft tissue mass lesion along the right paramidline posterior pelvis anterior to the sacrum, appearing posterior to the uterus as seen on axial image 116 measuring approximately 3.1 x 3.2 cm on axial imaging, measuring approximately 3.3 cm in the craniocaudal dimension on coronal image 30.    There is no evidence for small bowel obstructive process.  There is a blind-ending structure arising from the cecum that is thought to represent the appendix, as seen extending posteriorly on axial images 111 through 118.  This does appear prominent in caliber, measuring up to approximately 9.4 mm in diameter, however does not demonstrate a thick-walled appearance, the lumen appears filled with stool like material and air, there is no abnormal surrounding  edema or inflammatory change, and the appearance is therefore not thought to represent acute appendicitis, this may relate to variant anatomic configuration of the appendix, distended with ingested material.  The appendix is not well delineated on the prior noncontrast CT examination.  There is mild prominence of the colon with air and stool without inflammatory or obstructive pattern.    There is no evidence for free intraperitoneal air or significant abnormal free fluid of the abdomen or pelvis.    There is scoliotic curvature of the spine noted.  There is asymmetric sclerotic change involving the right sacroiliac joint, this may relate to sacroiliitis.  Chronic changes of the proximal right femur are noted, postoperative change of the left femur noted.  Overall the osseous structures demonstrate chronic change.                                       X-Ray Chest 1 View (Final result)  Result time 03/04/23 19:51:07      Final result by Alexei Hood MD (03/04/23 19:51:07)                   Impression:      1. Increased interstitial attenuation projects over the right upper lung zone.  This is suspected to be on the basis of accentuation from overlying soft tissue however given positioning, correlation is advised.  Consider PA and lateral radiograph for further evaluation as warranted.      Electronically signed by: Alexei Hood MD  Date:    03/04/2023  Time:    19:51               Narrative:    EXAMINATION:  XR CHEST 1 VIEW    CLINICAL HISTORY:  Right upper quadrant pain    TECHNIQUE:  Single frontal view of the chest was performed.    COMPARISON:  09/05/2010    FINDINGS:  The cardiomediastinal silhouette is not enlarged.  There is no pleural effusion.  The trachea is midline.  The lungs are symmetrically expanded bilaterally with increased interstitial attenuation projected over the right upper lung zone, accentuated by overlying habitus..  No large focal consolidation seen.  There is no pneumothorax.  The  osseous structures are unremarkable.                                    X-Rays:   Independently Interpreted Readings:   Chest X-Ray: Normal heart size.  No infiltrates.  No acute abnormalities.   Medications   acetaminophen tablet 1,000 mg (has no administration in time range)   sodium chloride 0.9% bolus 1,000 mL 1,000 mL (0 mLs Intravenous Stopped 3/4/23 2116)   ondansetron injection 4 mg (4 mg Intravenous Given 3/4/23 2152)   iohexoL (OMNIPAQUE 350) injection 75 mL (75 mLs Intravenous Given 3/4/23 2215)     Medical Decision Making:   History:   Old Medical Records: I decided to obtain old medical records.  Initial Assessment:   32-year-old female, with history of cerebral palsy, GERD, scoliosis, presents to the ED for right-sided abdominal pain. Patient is well appearing, afebrile, HD stable.   Differential Diagnosis:   Appendicitis, UTI, kidney stone, pneumonia, ovarian cyst rupture vs. Torsion.   Clinical Tests:   Lab Tests: Ordered and Reviewed  Radiological Study: Ordered and Reviewed  Medical Tests: Ordered and Reviewed           ED Course as of 03/05/23 0024   Sat Mar 04, 2023   2111 CBC auto differential(!)  No leukocytosis, anemia [NC]   2149 Comprehensive metabolic panel(!)  No electrolyte derangement, normal renal function [NC]   Sun Mar 05, 2023   0023 Urinalysis, Reflex to Urine Culture Urine, Clean Catch(!)  No UTI [NC]   0023 Sign out given to incoming team, pending CT abd and final disposition  [NC]      ED Course User Index  [NC] Stevo Downing MD                   Clinical Impression:   Final diagnoses:  [R10.9] Abdominal pain  [R10.11] RUQ pain               Stevo Downing MD  Resident  03/05/23 0024

## 2023-03-09 ENCOUNTER — OFFICE VISIT (OUTPATIENT)
Dept: OBSTETRICS AND GYNECOLOGY | Facility: CLINIC | Age: 33
End: 2023-03-09
Payer: MEDICAID

## 2023-03-09 VITALS
DIASTOLIC BLOOD PRESSURE: 75 MMHG | SYSTOLIC BLOOD PRESSURE: 117 MMHG | HEIGHT: 62 IN | WEIGHT: 91.94 LBS | BODY MASS INDEX: 16.92 KG/M2

## 2023-03-09 DIAGNOSIS — Z01.419 WELL WOMAN EXAM WITH ROUTINE GYNECOLOGICAL EXAM: Primary | ICD-10-CM

## 2023-03-09 DIAGNOSIS — D21.9 FIBROIDS: ICD-10-CM

## 2023-03-09 DIAGNOSIS — Z11.51 ENCOUNTER FOR SCREENING FOR HUMAN PAPILLOMAVIRUS (HPV): ICD-10-CM

## 2023-03-09 DIAGNOSIS — Z12.4 CERVICAL CANCER SCREENING: ICD-10-CM

## 2023-03-09 DIAGNOSIS — N92.0 MENORRHAGIA WITH REGULAR CYCLE: ICD-10-CM

## 2023-03-09 DIAGNOSIS — Z30.011 ENCOUNTER FOR INITIAL PRESCRIPTION OF CONTRACEPTIVE PILLS: ICD-10-CM

## 2023-03-09 PROCEDURE — 99999 PR PBB SHADOW E&M-EST. PATIENT-LVL IV: ICD-10-PCS | Mod: PBBFAC,,, | Performed by: OBSTETRICS & GYNECOLOGY

## 2023-03-09 PROCEDURE — 3074F PR MOST RECENT SYSTOLIC BLOOD PRESSURE < 130 MM HG: ICD-10-PCS | Mod: CPTII,,, | Performed by: OBSTETRICS & GYNECOLOGY

## 2023-03-09 PROCEDURE — 1159F MED LIST DOCD IN RCRD: CPT | Mod: CPTII,,, | Performed by: OBSTETRICS & GYNECOLOGY

## 2023-03-09 PROCEDURE — 99385 PREV VISIT NEW AGE 18-39: CPT | Mod: S$PBB,,, | Performed by: OBSTETRICS & GYNECOLOGY

## 2023-03-09 PROCEDURE — 87624 HPV HI-RISK TYP POOLED RSLT: CPT

## 2023-03-09 PROCEDURE — 3008F PR BODY MASS INDEX (BMI) DOCUMENTED: ICD-10-PCS | Mod: CPTII,,, | Performed by: OBSTETRICS & GYNECOLOGY

## 2023-03-09 PROCEDURE — 88142 CYTOPATH C/V THIN LAYER: CPT

## 2023-03-09 PROCEDURE — 99385 PR PREVENTIVE VISIT,NEW,18-39: ICD-10-PCS | Mod: S$PBB,,, | Performed by: OBSTETRICS & GYNECOLOGY

## 2023-03-09 PROCEDURE — 99999 PR PBB SHADOW E&M-EST. PATIENT-LVL IV: CPT | Mod: PBBFAC,,, | Performed by: OBSTETRICS & GYNECOLOGY

## 2023-03-09 PROCEDURE — 99214 OFFICE O/P EST MOD 30 MIN: CPT | Mod: PBBFAC,PN | Performed by: OBSTETRICS & GYNECOLOGY

## 2023-03-09 PROCEDURE — 1160F RVW MEDS BY RX/DR IN RCRD: CPT | Mod: CPTII,,, | Performed by: OBSTETRICS & GYNECOLOGY

## 2023-03-09 PROCEDURE — 3008F BODY MASS INDEX DOCD: CPT | Mod: CPTII,,, | Performed by: OBSTETRICS & GYNECOLOGY

## 2023-03-09 PROCEDURE — 3074F SYST BP LT 130 MM HG: CPT | Mod: CPTII,,, | Performed by: OBSTETRICS & GYNECOLOGY

## 2023-03-09 PROCEDURE — 1160F PR REVIEW ALL MEDS BY PRESCRIBER/CLIN PHARMACIST DOCUMENTED: ICD-10-PCS | Mod: CPTII,,, | Performed by: OBSTETRICS & GYNECOLOGY

## 2023-03-09 PROCEDURE — 3078F DIAST BP <80 MM HG: CPT | Mod: CPTII,,, | Performed by: OBSTETRICS & GYNECOLOGY

## 2023-03-09 PROCEDURE — 1159F PR MEDICATION LIST DOCUMENTED IN MEDICAL RECORD: ICD-10-PCS | Mod: CPTII,,, | Performed by: OBSTETRICS & GYNECOLOGY

## 2023-03-09 PROCEDURE — 3078F PR MOST RECENT DIASTOLIC BLOOD PRESSURE < 80 MM HG: ICD-10-PCS | Mod: CPTII,,, | Performed by: OBSTETRICS & GYNECOLOGY

## 2023-03-09 RX ORDER — NORELGESTROMIN AND ETHINYL ESTRADIOL 35; 150 UG/MG; UG/MG
1 PATCH TRANSDERMAL
Qty: 3 PATCH | Refills: 12 | Status: SHIPPED | OUTPATIENT
Start: 2023-03-09 | End: 2023-03-24 | Stop reason: ALTCHOICE

## 2023-03-09 RX ORDER — EPINEPHRINE 0.3 MG/.3ML
INJECTION SUBCUTANEOUS
COMMUNITY
Start: 2022-05-19

## 2023-03-09 RX ORDER — SERTRALINE HYDROCHLORIDE 100 MG/1
100 TABLET, FILM COATED ORAL
COMMUNITY
Start: 2023-02-24

## 2023-03-09 RX ORDER — DICLOFENAC EPOLAMINE 0.01 G/1
1 SYSTEM TOPICAL
COMMUNITY
Start: 2022-10-05 | End: 2023-10-05

## 2023-03-09 RX ORDER — LEVOCETIRIZINE DIHYDROCHLORIDE 5 MG/1
5 TABLET, FILM COATED ORAL
COMMUNITY
Start: 2023-02-27

## 2023-03-09 RX ORDER — METHOCARBAMOL 750 MG/1
750 TABLET, FILM COATED ORAL 3 TIMES DAILY
COMMUNITY
Start: 2023-02-17

## 2023-03-09 RX ORDER — LIDOCAINE 50 MG/G
1 PATCH TOPICAL EVERY MORNING
COMMUNITY
Start: 2022-12-03

## 2023-03-09 RX ORDER — CARBIDOPA AND LEVODOPA 25; 100 MG/1; MG/1
1 TABLET ORAL 3 TIMES DAILY
COMMUNITY
Start: 2023-02-06

## 2023-03-09 NOTE — PROGRESS NOTES
Past medical, surgical, social, family, and obstetric histories; medications; prior records and results; and available outside records were reviewed and updated in the EMR.  Pertinent findings were noted below.    Reason for Visit   Pelvic Mass    HPI   32 y.o. female No obstetric history on file.    New patient: Yes    Menopausal: No    History of abnormal paps: N/A  Abnormal or postmenopausal bleeding:  Menorrhagia with cycles , reports clots  History of abnormal mammograms:N/A   Family history of breast or ovarian cancer:  Aunt (breast) and cousin (ovarian) on paternal side  Any breast masses, pain, skin changes, or nipple discharge: DENIES  Possible recent STD exposure: Denies, not sexually active  Contraception: None    Pt and Pt's mother (at bedside) report history of heavy menstrual bleeding with regular cycles, clotting for the first 2-3 days and then the bleeding lessens  Was on lo-estrin previously for menorrhagia but has since stopped  With this most recent cycle, started having pelvic/abdominal pain a week before onset and has continued with daily cramping  Denies taking anything for this pain, no ibuprofen/motrin/tylenol  Denies abnormal vaginal discharge, itching, irritation. Denies dysuria, hematuria  Reports irregular BM, takes miralax      Pap: 3/9/2023, Done today  Mammogram: N/A  Allergies: Penicillins, Shellfish containing products, and Omeprazole    Review of Systems   Constitutional: Negative.  Negative for chills, diaphoresis, fatigue and fever.   HENT:  Negative for nasal congestion.    Respiratory: Negative.  Negative for shortness of breath.    Cardiovascular: Negative.  Negative for chest pain.   Gastrointestinal:  Positive for abdominal pain. Negative for bloating, diarrhea, nausea and vomiting.   Endocrine: Negative.    Genitourinary:  Positive for dysmenorrhea, menorrhagia, menstrual problem and pelvic pain. Negative for dysuria, vaginal discharge, vaginal pain, urinary incontinence,  "vaginal dryness and vaginal odor.   Integumentary:  Negative for rash. Negative.   Neurological:  Negative for headaches.   Psychiatric/Behavioral: Negative.     All other systems reviewed and are negative.  Breast: negative.      Exam   /75   Ht 5' 2" (1.575 m)   Wt 41.7 kg (91 lb 14.9 oz)   LMP 03/03/2023   BMI 16.81 kg/m²     Physical Exam  Constitutional:       General: She is not in acute distress.     Appearance: Normal appearance. She is well-developed. She is not toxic-appearing.   Genitourinary:      Vulva, bladder and urethral meatus normal.      Right Labia: No rash, lesions or Bartholin's cyst.     Left Labia: No lesions, Bartholin's cyst or rash.     No vaginal discharge or bleeding.        Right Adnexa: not tender and not full.     Left Adnexa: not tender and not full.     No cervical motion tenderness, discharge or lesion.      Uterus is not enlarged or tender.      No urethral tenderness present.      Pelvic exam was performed with patient in the lithotomy position.   Breasts:     Breasts are symmetrical.      Right: No mass, nipple discharge, skin change or tenderness.      Left: No mass, nipple discharge, skin change or tenderness.   Neck:      Thyroid: No thyroid mass.   Cardiovascular:      Rate and Rhythm: Normal rate.   Pulmonary:      Effort: Pulmonary effort is normal. No respiratory distress.   Abdominal:      General: There is no distension.      Palpations: Abdomen is soft. There is no hepatomegaly, splenomegaly or mass.      Tenderness: There is no abdominal tenderness. There is no guarding or rebound.      Hernia: No hernia is present.          Comments: Incision, well healed, from previous pump used to prevent spasticity/relax muscles due to cerebral palsy    Musculoskeletal:      Right lower leg: No edema.      Left lower leg: No edema.   Lymphadenopathy:      Cervical: No cervical adenopathy.      Upper Body:      Right upper body: No axillary adenopathy.      Left upper " body: No axillary adenopathy.   Neurological:      Mental Status: She is alert and oriented to person, place, and time.   Skin:     Findings: No rash.   Psychiatric:         Mood and Affect: Mood and affect normal.   Exam conducted with a chaperone present.     Assessment and Plan   Well woman exam with routine gynecological exam  -     Liquid-Based Pap Smear, Screening  -     HPV High Risk Genotypes, PCR    Cervical cancer screening  -     Liquid-Based Pap Smear, Screening    Encounter for screening for human papillomavirus (HPV)  -     HPV High Risk Genotypes, PCR    Fibroids  -     Ambulatory referral/consult to Gynecology  -     norelgestromin-ethinyl estradiol 150-35 mcg/24 hr; Place 1 patch onto the skin every 7 days. Apply 1 patch per week for 3 weeks, then none for 1 week.  Dispense: 3 patch; Refill: 12    Menorrhagia with regular cycle  -     norelgestromin-ethinyl estradiol 150-35 mcg/24 hr; Place 1 patch onto the skin every 7 days. Apply 1 patch per week for 3 weeks, then none for 1 week.  Dispense: 3 patch; Refill: 12      Annual exam  Breast and pelvic exam: WNL  Patient was counseled on ASCCP guidelines for cervical cytology screening  Cervical screening: completed today   Patient was counseled on current recommendations for breast cancer screening  Mammogram screening: @ 39yo unless new risk factors warrant earlier screening  VitD/Ca supplementation recommendations based on age:  <50yoa - Ca 1000mg/day, VitD 600IU/day  51-70yoa - Ca 1200mg/day, VitD 600IU/day  >71yoa - Ca 1200mg/day, VitD 800IU/day  STD testing: declined, not sexually active  Contraception: Xulane patch prescribed, Discussed all methods and pt declined TVUS for assessment for placement of IUD  Counseled that if patch does not help with heavy bleeding/pain, can discuss other options. Encouraged to try the patch for 3-4 months   CT and abdominal U/S results reviewed with both patient and patient's mother. Signs and symptoms of fibroid  uterus discussed. All management options discussed; Both desire medical management at this time     She was counseled to follow up with her PCP for other routine health maintenance     Christine Bosch MD  OBGYN PGY-2    I have reviewed the Resident's progress note.  I have personally interviewed and examined the patient at bedside and agree with the findings as documented.  All patient questions answered.  -- OMID Rodriguez M.D.

## 2023-03-14 LAB
HPV HR 12 DNA SPEC QL NAA+PROBE: POSITIVE
HPV16 AG SPEC QL: NEGATIVE
HPV18 DNA SPEC QL NAA+PROBE: NEGATIVE

## 2023-03-15 LAB
FINAL PATHOLOGIC DIAGNOSIS: NORMAL
Lab: NORMAL

## 2023-03-24 RX ORDER — LEVONORGESTREL AND ETHINYL ESTRADIOL 0.15-0.03
1 KIT ORAL DAILY
Qty: 28 TABLET | Refills: 12 | Status: SHIPPED | OUTPATIENT
Start: 2023-03-24 | End: 2023-05-16 | Stop reason: SDUPTHER

## 2023-03-24 NOTE — PROGRESS NOTES
- Patch rx canceled  - Rx for OCPs ordered to patient's pharmacy    ==================================================  Received: Yesterday  Jadiel Angeles MA sent to OMID Rodriguez MD  Caller: Unspecified (Yesterday, 10:24 AM)         Previous Messages       ----- Message -----   From: Suha Anguiano   Sent: 3/23/2023  10:27 AM CDT   To: [facility] Staff     Name of Who is Calling:Beatriz (Sister)               What is the request in detail:Requesting the pill form of birth control. Patient states that the patches are making her itch.               Can the clinic reply by [facility]:no               What Number to Call Back if not in [facility]:[phone]

## 2023-03-31 ENCOUNTER — TELEPHONE (OUTPATIENT)
Dept: OBSTETRICS AND GYNECOLOGY | Facility: CLINIC | Age: 33
End: 2023-03-31
Payer: MEDICAID

## 2023-03-31 NOTE — TELEPHONE ENCOUNTER
----- Message from Constantine Romero sent at 3/31/2023  2:03 PM CDT -----   Name of Who is Calling:     What is the request in detail:  request call back in reference to appointment / caller want to know when should patient schedule follow up Please contact to further discuss and advise      Can the clinic reply by [facility]:     What Number to Call Back if not in [facility]:  [phone] / xuan /

## 2023-04-01 ENCOUNTER — HOSPITAL ENCOUNTER (EMERGENCY)
Facility: HOSPITAL | Age: 33
Discharge: HOME | End: 2023-04-02
Attending: STUDENT IN AN ORGANIZED HEALTH CARE EDUCATION/TRAINING PROGRAM
Payer: MEDICAID

## 2023-04-01 DIAGNOSIS — R07.81 PLEURODYNIA: Primary | ICD-10-CM

## 2023-04-01 DIAGNOSIS — R00.0 TACHYCARDIA: ICD-10-CM

## 2023-04-01 LAB
ALBUMIN SERPL BCP-MCNC: 3.5 G/DL (ref 3.5–5.2)
ALP SERPL-CCNC: 46 U/L (ref 55–135)
ALT SERPL W/O P-5'-P-CCNC: 11 U/L (ref 10–44)
ANION GAP SERPL CALC-SCNC: 11 MMOL/L (ref 8–16)
AST SERPL-CCNC: 19 U/L (ref 10–40)
BASOPHILS # BLD AUTO: 0.17 K/UL (ref 0–0.2)
BASOPHILS NFR BLD: 1.8 % (ref 0–1.9)
BILIRUB SERPL-MCNC: 0.2 MG/DL (ref 0.1–1)
BNP SERPL-MCNC: <10 PG/ML (ref 0–99)
BUN SERPL-MCNC: 14 MG/DL (ref 6–20)
CALCIUM SERPL-MCNC: 9.4 MG/DL (ref 8.7–10.5)
CHLORIDE SERPL-SCNC: 105 MMOL/L (ref 95–110)
CO2 SERPL-SCNC: 25 MMOL/L (ref 23–29)
CREAT SERPL-MCNC: 0.7 MG/DL (ref 0.5–1.4)
D DIMER PPP IA.FEU-MCNC: 1.13 MG/L FEU
DIFFERENTIAL METHOD: ABNORMAL
EOSINOPHIL # BLD AUTO: 1.1 K/UL (ref 0–0.5)
EOSINOPHIL NFR BLD: 11.5 % (ref 0–8)
ERYTHROCYTE [DISTWIDTH] IN BLOOD BY AUTOMATED COUNT: 14.7 % (ref 11.5–14.5)
EST. GFR  (NO RACE VARIABLE): >60 ML/MIN/1.73 M^2
GLUCOSE SERPL-MCNC: 82 MG/DL (ref 70–110)
HCT VFR BLD AUTO: 32.2 % (ref 37–48.5)
HGB BLD-MCNC: 10.6 G/DL (ref 12–16)
IMM GRANULOCYTES # BLD AUTO: 0.02 K/UL (ref 0–0.04)
IMM GRANULOCYTES NFR BLD AUTO: 0.2 % (ref 0–0.5)
LYMPHOCYTES # BLD AUTO: 4.1 K/UL (ref 1–4.8)
LYMPHOCYTES NFR BLD: 43.3 % (ref 18–48)
MAGNESIUM SERPL-MCNC: 1.8 MG/DL (ref 1.6–2.6)
MCH RBC QN AUTO: 27.7 PG (ref 27–31)
MCHC RBC AUTO-ENTMCNC: 32.9 G/DL (ref 32–36)
MCV RBC AUTO: 84 FL (ref 82–98)
MONOCYTES # BLD AUTO: 1 K/UL (ref 0.3–1)
MONOCYTES NFR BLD: 10 % (ref 4–15)
NEUTROPHILS # BLD AUTO: 3.2 K/UL (ref 1.8–7.7)
NEUTROPHILS NFR BLD: 33.2 % (ref 38–73)
NRBC BLD-RTO: 0 /100 WBC
PLATELET # BLD AUTO: 384 K/UL (ref 150–450)
PMV BLD AUTO: 10.2 FL (ref 9.2–12.9)
POTASSIUM SERPL-SCNC: 3.9 MMOL/L (ref 3.5–5.1)
PROT SERPL-MCNC: 7.7 G/DL (ref 6–8.4)
RBC # BLD AUTO: 3.82 M/UL (ref 4–5.4)
SODIUM SERPL-SCNC: 141 MMOL/L (ref 136–145)
TROPONIN I SERPL DL<=0.01 NG/ML-MCNC: <0.006 NG/ML (ref 0–0.03)
TROPONIN I SERPL DL<=0.01 NG/ML-MCNC: <0.006 NG/ML (ref 0–0.03)
TSH SERPL DL<=0.005 MIU/L-ACNC: 0.82 UIU/ML (ref 0.4–4)
WBC # BLD AUTO: 9.54 K/UL (ref 3.9–12.7)

## 2023-04-01 PROCEDURE — 93010 EKG 12-LEAD: ICD-10-PCS | Mod: ,,, | Performed by: INTERNAL MEDICINE

## 2023-04-01 PROCEDURE — 81025 URINE PREGNANCY TEST: CPT | Performed by: STUDENT IN AN ORGANIZED HEALTH CARE EDUCATION/TRAINING PROGRAM

## 2023-04-01 PROCEDURE — 80053 COMPREHEN METABOLIC PANEL: CPT | Performed by: STUDENT IN AN ORGANIZED HEALTH CARE EDUCATION/TRAINING PROGRAM

## 2023-04-01 PROCEDURE — 99284 PR EMERGENCY DEPT VISIT,LEVEL IV: ICD-10-PCS | Mod: ,,, | Performed by: STUDENT IN AN ORGANIZED HEALTH CARE EDUCATION/TRAINING PROGRAM

## 2023-04-01 PROCEDURE — 85025 COMPLETE CBC W/AUTO DIFF WBC: CPT | Performed by: STUDENT IN AN ORGANIZED HEALTH CARE EDUCATION/TRAINING PROGRAM

## 2023-04-01 PROCEDURE — 84484 ASSAY OF TROPONIN QUANT: CPT | Performed by: STUDENT IN AN ORGANIZED HEALTH CARE EDUCATION/TRAINING PROGRAM

## 2023-04-01 PROCEDURE — 93005 ELECTROCARDIOGRAM TRACING: CPT

## 2023-04-01 PROCEDURE — 84443 ASSAY THYROID STIM HORMONE: CPT | Performed by: STUDENT IN AN ORGANIZED HEALTH CARE EDUCATION/TRAINING PROGRAM

## 2023-04-01 PROCEDURE — 83880 ASSAY OF NATRIURETIC PEPTIDE: CPT | Performed by: STUDENT IN AN ORGANIZED HEALTH CARE EDUCATION/TRAINING PROGRAM

## 2023-04-01 PROCEDURE — 99285 EMERGENCY DEPT VISIT HI MDM: CPT | Mod: 25

## 2023-04-01 PROCEDURE — 85379 FIBRIN DEGRADATION QUANT: CPT | Performed by: STUDENT IN AN ORGANIZED HEALTH CARE EDUCATION/TRAINING PROGRAM

## 2023-04-01 PROCEDURE — 93010 ELECTROCARDIOGRAM REPORT: CPT | Mod: ,,, | Performed by: INTERNAL MEDICINE

## 2023-04-01 PROCEDURE — 99284 EMERGENCY DEPT VISIT MOD MDM: CPT | Mod: ,,, | Performed by: STUDENT IN AN ORGANIZED HEALTH CARE EDUCATION/TRAINING PROGRAM

## 2023-04-01 PROCEDURE — 83735 ASSAY OF MAGNESIUM: CPT | Performed by: STUDENT IN AN ORGANIZED HEALTH CARE EDUCATION/TRAINING PROGRAM

## 2023-04-01 PROCEDURE — 84484 ASSAY OF TROPONIN QUANT: CPT | Mod: 91 | Performed by: STUDENT IN AN ORGANIZED HEALTH CARE EDUCATION/TRAINING PROGRAM

## 2023-04-02 VITALS
HEART RATE: 88 BPM | OXYGEN SATURATION: 100 % | TEMPERATURE: 98 F | RESPIRATION RATE: 16 BRPM | DIASTOLIC BLOOD PRESSURE: 62 MMHG | SYSTOLIC BLOOD PRESSURE: 115 MMHG

## 2023-04-02 LAB
B-HCG UR QL: NEGATIVE
CTP QC/QA: YES

## 2023-04-02 PROCEDURE — 25500020 PHARM REV CODE 255: Performed by: STUDENT IN AN ORGANIZED HEALTH CARE EDUCATION/TRAINING PROGRAM

## 2023-04-02 RX ADMIN — IOHEXOL 75 ML: 350 INJECTION, SOLUTION INTRAVENOUS at 01:04

## 2023-04-02 NOTE — ED NOTES
Patient identifiers verified and correct for   LOC: The patient is awake, alert and aware of environment with an appropriate affect, the patient is oriented x 3 and speaking appropriately.   APPEARANCE: Patient appears comfortable and in no acute distress, patient is clean and well groomed.  SKIN: The skin is warm and dry, color consistent with ethnicity, patient has normal skin turgor and moist mucus membranes, skin intact, no breakdown or bruising noted.   MUSCULOSKELETAL: Patient moving all extremities spontaneously, no swelling noted.  RESPIRATORY: Airway is open and patent, respirations are spontaneous, patient has a normal effort and rate, no accessory muscle use noted, pt placed on continuous pulse ox with O2 sats noted at 97% on room air.  CARDIAC: Pt placed on cardiac monitor. Patient has a normal rate and regular rhythm, no edema noted, capillary refill < 3 seconds. Chest pain when breathing.  GASTRO: Soft and non tender to palpation, no distention noted, normoactive bowel sounds present in all four quadrants. Pt states bowel movements have been regular.  : Pt denies any pain or frequency with urination.  NEURO: Pt opens eyes spontaneously, behavior appropriate to situation, follows commands, facial expression symmetrical, bilateral hand grasp equal and even, purposeful motor response noted, normal sensation in all extremities when touched with a finger.

## 2023-04-02 NOTE — ED PROVIDER NOTES
"Encounter Date: 4/1/2023       History     Chief Complaint   Patient presents with    Chest Pain     C/o pressured midsternal CP when taking deep breaths that began yesterday. Denies SOB. Hx cerebral palsy     Patient is a 32-year-old female with a past medical history of cerebral palsy and scoliosis presenting to the ED for complaints of pleuritic chest pain that began yesterday. She does not have any chest pain without inspiration. She further denies any cough, congestion, infectious symptoms, shortness of breath, palpitations, leg swelling/pain, recent travel, recent surgery or prior history of DVTs/PEs.    Patient's sister also provides most of the history, stating that patient has also had over the last month, intermittent "hot flashes".  No known thyroid issues.    Review of patient's allergies indicates:   Allergen Reactions    Penicillins Anaphylaxis     Hives  Other reaction(s): Unknown  Hives      Shellfish containing products Anaphylaxis    Omeprazole Other (See Comments)     Other reaction(s): Unknown  "It made me paranoid and depressed."       Past Medical History:   Diagnosis Date    Cerebral palsy     GERD (gastroesophageal reflux disease)     Scoliosis      Past Surgical History:   Procedure Laterality Date    CHOLECYSTECTOMY      HIP SURGERY  2000    Patient has surgery to straighten leg bone.     HIP SURGERY      vascular pump      WISDOM TOOTH EXTRACTION Bilateral 2016     Family History   Problem Relation Age of Onset    Diabetes Maternal Grandmother     Heart disease Maternal Grandmother     Ovarian cancer Paternal Aunt     Breast cancer Paternal Cousin     Colon cancer Neg Hx      Social History     Tobacco Use    Smoking status: Never    Smokeless tobacco: Never   Substance Use Topics    Alcohol use: No    Drug use: No     Review of Systems    Physical Exam     Initial Vitals [04/01/23 2128]   BP Pulse Resp Temp SpO2   129/70 (!) 117 16 97.9 °F (36.6 °C) 100 %      MAP       --     "     Physical Exam    Nursing note and vitals reviewed.  Constitutional: She appears well-developed and well-nourished. She is not diaphoretic. No distress.   Chronically ill appearing. Communicates primarily by shaking/nodding head to yes/no questions.  Protecting airway.  No acute distress.   HENT:   Head: Normocephalic and atraumatic.   Right Ear: External ear normal.   Left Ear: External ear normal.   Neck: Neck supple.   Cardiovascular:  Normal rate, regular rhythm, normal heart sounds and intact distal pulses.           Pulmonary/Chest: Breath sounds normal. No respiratory distress. She has no wheezes. She has no rhonchi. She has no rales.   Abdominal: Abdomen is soft. She exhibits no distension. A surgical scar is present.   Surgical scar present to LLQ at site of prior baclofen pump. There is no rebound and no guarding.   Musculoskeletal:      Cervical back: Neck supple.     Neurological: She is alert and oriented to person, place, and time. GCS score is 15. GCS eye subscore is 4. GCS verbal subscore is 5. GCS motor subscore is 6.   Skin: Skin is warm. Capillary refill takes less than 2 seconds. No rash noted.   Psychiatric: She has a normal mood and affect.       ED Course   Procedures  Labs Reviewed   CBC W/ AUTO DIFFERENTIAL - Abnormal; Notable for the following components:       Result Value    RBC 3.82 (*)     Hemoglobin 10.6 (*)     Hematocrit 32.2 (*)     RDW 14.7 (*)     Eos # 1.1 (*)     Gran % 33.2 (*)     Eosinophil % 11.5 (*)     All other components within normal limits   COMPREHENSIVE METABOLIC PANEL - Abnormal; Notable for the following components:    Alkaline Phosphatase 46 (*)     All other components within normal limits    Narrative:     ADD ON TROPONIN PER DR AMANDEEP HAILE/ORDER# [id number] @ 22:14   D DIMER, QUANTITATIVE - Abnormal; Notable for the following components:    D-Dimer 1.13 (*)     All other components within normal limits    Narrative:     ADD ON TROPONIN PER DR BERNSTEIN  LAZARA/ORDER# [id number] @ 22:14   B-TYPE NATRIURETIC PEPTIDE    Narrative:     ADD ON TROPONIN PER DR AMANDEEP ABREUT/ORDER# [id number] @ 22:14   TSH    Narrative:     ADD ON TROPONIN PER DR AMANDEEP ABREUT/ORDER# [id number] @ 22:14   MAGNESIUM    Narrative:     ADD ON TROPONIN PER DR AMANDEEP ABREUT/ORDER# [id number] @ 22:14   TROPONIN I   TROPONIN I    Narrative:     ADD ON TROPONIN PER DR AMANDEEP HAILE/ORDER# [id number] @ 22:14   POCT URINE PREGNANCY     EKG Readings: (Independently Interpreted)   Initial Reading: No STEMI. Previous EKG: Compared with most recent EKG Previous EKG Date: 03/04/2023. Rhythm: Normal Sinus Rhythm. Heart Rate: 94. Ectopy: No Ectopy. Conduction: Normal.     Imaging Results              X-Ray Chest AP Portable (Final result)  Result time 04/01/23 22:57:24      Final result by Nixon Mcdonough MD (04/01/23 22:57:24)                   Impression:      No acute process.      Electronically signed by: Nixon Mcdonough MD  Date:    04/01/2023  Time:    22:57               Narrative:    EXAMINATION:  XR CHEST AP PORTABLE    CLINICAL HISTORY:  Tachycardia, unspecified    TECHNIQUE:  Single frontal view of the chest was performed.    COMPARISON:  03/04/2023.    FINDINGS:  Monitoring EKG leads are present.  The trachea is unremarkable.  The cardiomediastinal silhouette is within limits.  The hemidiaphragms unremarkable.  There are no pleural effusions.  There is no evidence of a pneumothorax.  There is no evidence of pneumomediastinum.  No airspace opacity is present.  The osseous structures are unremarkable.                                       Medications - No data to display  Medical Decision Making:   History:   I obtained history from: someone other than patient.  Initial Assessment:   Emergent evaluation of pleuritic chest pain.  She is afebrile and hemodynamically stable, noting that she had initial tachycardia to 117.  Differential Diagnosis:   PE, hyperthyroidism, doubt pneumothorax, doubt ACS,  electrolyte abnormality, pleurisy, anxiety  Clinical Tests:   Lab Tests: Ordered and Reviewed  Radiological Study: Ordered and Reviewed  Medical Tests: Ordered and Reviewed  ED Management:  Workup remarkable for mildly elevated d-dimer at 1.13.  Otherwise, nonrevealing with normal TSH.  She was signed out to the oncoming provider at shift change pending a CTA PE study.  Anticipate likely discharge if negative.           ED Course as of 04/02/23 0011   Sat Apr 01, 2023   2218 EKG with normal sinus rhythm, rate 94, no STEMI. [NN]      ED Course User Index  [NN] Narcisa Rizo MD                 Clinical Impression:   Final diagnoses:  [R00.0] Tachycardia  [R07.81] Pleurodynia (Primary)               Nabil Hernandez MD  Resident  04/02/23 0011

## 2023-04-02 NOTE — PROVIDER PROGRESS NOTES - EMERGENCY DEPT.
Encounter Date: 4/1/2023    ED Physician Progress Notes          STAFF PHYSICIAN F/U NOTE:  Christin Dukes has been evaluated and treated by Dr. Rizo.  Eighteen sign-out pending CTA inpatient with low clinical suspicion for thoracic aortic dissection or pulmonary emboli but elevated D-dimer.  Imaging Results              CTA Chest Non-Coronary (PE Studies) (Final result)  Result time 04/02/23 01:43:13      Final result by George Lewis MD (04/02/23 01:43:13)                   Impression:      1. No evidence of acute pulmonary thromboembolism.  2. Mild apical pleural and parenchymal scarring densities with no acute findings elsewhere in the chest.      Electronically signed by: George Lewis  Date:    04/02/2023  Time:    01:43               Narrative:    EXAMINATION:  CTA CHEST NON CORONARY (PE STUDIES)    CLINICAL HISTORY:  Pulmonary embolism (PE) suspected, positive D-dimer;    TECHNIQUE:  Following the intravenous administration of 75 cc of Omnipaque 350 contrast material, 1.25 mm contiguous axial images were acquired through the chest utilizing the CT pulmonary angiogram protocol.  2-D coronal and sagittal reconstructed images of the chest and sagittal oblique MIP images of the pulmonary arterial system were generated from the source data.    COMPARISON:  None.    FINDINGS:  Pulmonary arterial system: This is a good quality examination for the evaluation of pulmonary thromboembolism with good opacification of the pulmonary arteries to the level of the segmental branches of the pulmonary arterial system. There is no evidence of acute pulmonary thromboembolism. No large saddle pulmonary embolism, enlargement of the main pulmonary artery, or secondary findings of right ventricular strain.    Aorta and heart: The heart is normal in size.  No pericardial fluid.  No thoracic aortic aneurysm.  No thoracic aortic dissection.    Airways: Unremarkable.    Lymph nodes: No pathologically enlarged lymph nodes in  the chest or axilla.    Lungs: Scattered fibro nodular scar-like densities are noted in both lung apices in a subpleural location.  A small 15 mm pneumatocele is noted in the right lung apex.  Otherwise, the lungs are clear with no evidence of airspace consolidation, mass, or bronchiectasis.  No emphysematous lung architecture.    Pleura: No significant volume of pleural fluid or pneumothorax.  No pleural based masses.    Visualized upper abdominal soft tissues: No significant abnormalities appreciated.    Bones: There is degenerative change of the thoracic spine.                                       X-Ray Chest AP Portable (Final result)  Result time 04/01/23 22:57:24      Final result by Nixon Mcdonough MD (04/01/23 22:57:24)                   Impression:      No acute process.      Electronically signed by: Nixon Mcdonough MD  Date:    04/01/2023  Time:    22:57               Narrative:    EXAMINATION:  XR CHEST AP PORTABLE    CLINICAL HISTORY:  Tachycardia, unspecified    TECHNIQUE:  Single frontal view of the chest was performed.    COMPARISON:  03/04/2023.    FINDINGS:  Monitoring EKG leads are present.  The trachea is unremarkable.  The cardiomediastinal silhouette is within limits.  The hemidiaphragms unremarkable.  There are no pleural effusions.  There is no evidence of a pneumothorax.  There is no evidence of pneumomediastinum.  No airspace opacity is present.  The osseous structures are unremarkable.                                    She reports much improvement/complete resolution of Sx and is ready to return home. Currently patient reports no new Sx and is tolerating PO challenge. We discussed Sx warranting immediate ED return, which were acknowledged. I recommended F/U and discussion of ED visit with primary care physician.  ____________________  Fab Magaña MD, [facility]  Emergency Medicine Staff  2:40 AM 4/2/2023

## 2023-04-02 NOTE — DISCHARGE INSTRUCTIONS
Imaging Results              CTA Chest Non-Coronary (PE Studies) (Final result)  Result time 04/02/23 01:43:13      Final result by George Lewis MD (04/02/23 01:43:13)                   Impression:      1. No evidence of acute pulmonary thromboembolism.  2. Mild apical pleural and parenchymal scarring densities with no acute findings elsewhere in the chest.      Electronically signed by: George Lewis  Date:    04/02/2023  Time:    01:43               Narrative:    EXAMINATION:  CTA CHEST NON CORONARY (PE STUDIES)    CLINICAL HISTORY:  Pulmonary embolism (PE) suspected, positive D-dimer;    TECHNIQUE:  Following the intravenous administration of 75 cc of Omnipaque 350 contrast material, 1.25 mm contiguous axial images were acquired through the chest utilizing the CT pulmonary angiogram protocol.  2-D coronal and sagittal reconstructed images of the chest and sagittal oblique MIP images of the pulmonary arterial system were generated from the source data.    COMPARISON:  None.    FINDINGS:  Pulmonary arterial system: This is a good quality examination for the evaluation of pulmonary thromboembolism with good opacification of the pulmonary arteries to the level of the segmental branches of the pulmonary arterial system. There is no evidence of acute pulmonary thromboembolism. No large saddle pulmonary embolism, enlargement of the main pulmonary artery, or secondary findings of right ventricular strain.    Aorta and heart: The heart is normal in size.  No pericardial fluid.  No thoracic aortic aneurysm.  No thoracic aortic dissection.    Airways: Unremarkable.    Lymph nodes: No pathologically enlarged lymph nodes in the chest or axilla.    Lungs: Scattered fibro nodular scar-like densities are noted in both lung apices in a subpleural location.  A small 15 mm pneumatocele is noted in the right lung apex.  Otherwise, the lungs are clear with no evidence of airspace consolidation, mass, or bronchiectasis.  No  emphysematous lung architecture.    Pleura: No significant volume of pleural fluid or pneumothorax.  No pleural based masses.    Visualized upper abdominal soft tissues: No significant abnormalities appreciated.    Bones: There is degenerative change of the thoracic spine.                                       X-Ray Chest AP Portable (Final result)  Result time 04/01/23 22:57:24      Final result by Nixon Mcdonough MD (04/01/23 22:57:24)                   Impression:      No acute process.      Electronically signed by: Nixon Mcdonough MD  Date:    04/01/2023  Time:    22:57               Narrative:    EXAMINATION:  XR CHEST AP PORTABLE    CLINICAL HISTORY:  Tachycardia, unspecified    TECHNIQUE:  Single frontal view of the chest was performed.    COMPARISON:  03/04/2023.    FINDINGS:  Monitoring EKG leads are present.  The trachea is unremarkable.  The cardiomediastinal silhouette is within limits.  The hemidiaphragms unremarkable.  There are no pleural effusions.  There is no evidence of a pneumothorax.  There is no evidence of pneumomediastinum.  No airspace opacity is present.  The osseous structures are unremarkable.

## 2023-05-16 DIAGNOSIS — N92.0 MENORRHAGIA WITH REGULAR CYCLE: ICD-10-CM

## 2023-05-16 DIAGNOSIS — D21.9 FIBROIDS: ICD-10-CM

## 2023-05-16 DIAGNOSIS — Z30.011 ENCOUNTER FOR INITIAL PRESCRIPTION OF CONTRACEPTIVE PILLS: ICD-10-CM

## 2023-05-17 RX ORDER — LEVONORGESTREL AND ETHINYL ESTRADIOL 0.15-0.03
1 KIT ORAL DAILY
Qty: 28 TABLET | Refills: 12 | Status: SHIPPED | OUTPATIENT
Start: 2023-05-17 | End: 2023-05-28 | Stop reason: SDUPTHER

## 2023-05-25 ENCOUNTER — PATIENT MESSAGE (OUTPATIENT)
Dept: OBSTETRICS AND GYNECOLOGY | Facility: CLINIC | Age: 33
End: 2023-05-25
Payer: MEDICAID

## 2023-05-28 DIAGNOSIS — Z30.011 ENCOUNTER FOR INITIAL PRESCRIPTION OF CONTRACEPTIVE PILLS: ICD-10-CM

## 2023-05-28 DIAGNOSIS — D21.9 FIBROIDS: ICD-10-CM

## 2023-05-28 DIAGNOSIS — N92.0 MENORRHAGIA WITH REGULAR CYCLE: ICD-10-CM

## 2023-05-29 RX ORDER — LEVONORGESTREL AND ETHINYL ESTRADIOL 0.15-0.03
1 KIT ORAL DAILY
Qty: 28 TABLET | Refills: 12 | Status: SHIPPED | OUTPATIENT
Start: 2023-05-29 | End: 2023-08-30 | Stop reason: SDUPTHER

## 2023-08-30 DIAGNOSIS — D21.9 FIBROIDS: ICD-10-CM

## 2023-08-30 DIAGNOSIS — Z30.011 ENCOUNTER FOR INITIAL PRESCRIPTION OF CONTRACEPTIVE PILLS: ICD-10-CM

## 2023-08-30 DIAGNOSIS — N92.0 MENORRHAGIA WITH REGULAR CYCLE: ICD-10-CM

## 2023-09-05 RX ORDER — LEVONORGESTREL AND ETHINYL ESTRADIOL 0.15-0.03
1 KIT ORAL DAILY
Qty: 28 TABLET | Refills: 12 | Status: SHIPPED | OUTPATIENT
Start: 2023-09-05 | End: 2024-09-03

## 2023-10-30 NOTE — ED NOTES
Pt is requesting a referral to Dr. Saba for Cardiology. He has seen him in the past, but it has been long enough for him to need a new referral. He also requested to be seen by a different gastro provider within [facility]. Ivy told him he needed to call that office to request someone other than Dr. Graves as he has seen him in the past, pt decided to forget about that referral.    Up to BR for void with family to assist.

## 2024-02-27 ENCOUNTER — PATIENT MESSAGE (OUTPATIENT)
Dept: OBSTETRICS AND GYNECOLOGY | Facility: CLINIC | Age: 34
End: 2024-02-27
Payer: MEDICAID

## 2024-04-15 DIAGNOSIS — D21.9 FIBROIDS: ICD-10-CM

## 2024-04-15 DIAGNOSIS — N92.0 MENORRHAGIA WITH REGULAR CYCLE: ICD-10-CM

## 2024-04-15 DIAGNOSIS — Z30.011 ENCOUNTER FOR INITIAL PRESCRIPTION OF CONTRACEPTIVE PILLS: ICD-10-CM

## 2024-04-15 RX ORDER — LEVONORGESTREL AND ETHINYL ESTRADIOL 0.15-0.03
1 KIT ORAL
Qty: 84 TABLET | Refills: 0 | Status: SHIPPED | OUTPATIENT
Start: 2024-04-15 | End: 2024-06-14 | Stop reason: SDUPTHER

## 2024-04-15 NOTE — TELEPHONE ENCOUNTER
Refill Decision Note   Christin Dukes  is requesting a refill authorization.  Brief Assessment and Rationale for Refill:  Approve     Medication Therapy Plan:        Comments:     Note composed:11:55 AM 04/15/2024

## 2024-06-14 DIAGNOSIS — D21.9 FIBROIDS: ICD-10-CM

## 2024-06-14 DIAGNOSIS — Z30.011 ENCOUNTER FOR INITIAL PRESCRIPTION OF CONTRACEPTIVE PILLS: ICD-10-CM

## 2024-06-14 DIAGNOSIS — N92.0 MENORRHAGIA WITH REGULAR CYCLE: ICD-10-CM

## 2024-06-16 RX ORDER — LEVONORGESTREL AND ETHINYL ESTRADIOL 0.15-0.03
1 KIT ORAL DAILY
Qty: 84 TABLET | Refills: 4 | Status: SHIPPED | OUTPATIENT
Start: 2024-06-16

## 2024-07-23 DIAGNOSIS — N92.0 MENORRHAGIA WITH REGULAR CYCLE: ICD-10-CM

## 2024-07-23 DIAGNOSIS — Z30.011 ENCOUNTER FOR INITIAL PRESCRIPTION OF CONTRACEPTIVE PILLS: ICD-10-CM

## 2024-07-23 DIAGNOSIS — D21.9 FIBROIDS: ICD-10-CM

## 2024-07-25 RX ORDER — LEVONORGESTREL AND ETHINYL ESTRADIOL 0.15-0.03
1 KIT ORAL DAILY
Qty: 84 TABLET | Refills: 4 | Status: SHIPPED | OUTPATIENT
Start: 2024-07-25

## 2024-08-09 ENCOUNTER — OFFICE VISIT (OUTPATIENT)
Dept: OBSTETRICS AND GYNECOLOGY | Facility: CLINIC | Age: 34
End: 2024-08-09
Payer: MEDICAID

## 2024-08-09 VITALS — BODY MASS INDEX: 16.81 KG/M2 | HEIGHT: 62 IN

## 2024-08-09 DIAGNOSIS — Z01.411 ENCOUNTER FOR GYNECOLOGICAL EXAMINATION (GENERAL) (ROUTINE) WITH ABNORMAL FINDINGS: Primary | ICD-10-CM

## 2024-08-09 DIAGNOSIS — R87.810 CERVICAL HIGH RISK HPV (HUMAN PAPILLOMAVIRUS) TEST POSITIVE: ICD-10-CM

## 2024-08-09 DIAGNOSIS — Z30.41 ENCOUNTER FOR SURVEILLANCE OF CONTRACEPTIVE PILLS: ICD-10-CM

## 2024-08-09 DIAGNOSIS — Z11.51 ENCOUNTER FOR SCREENING FOR HUMAN PAPILLOMAVIRUS (HPV): ICD-10-CM

## 2024-08-09 DIAGNOSIS — Z12.4 ENCOUNTER FOR SCREENING FOR MALIGNANT NEOPLASM OF CERVIX: ICD-10-CM

## 2024-08-09 DIAGNOSIS — N92.0 MENORRHAGIA WITH REGULAR CYCLE: ICD-10-CM

## 2024-08-09 PROCEDURE — 99213 OFFICE O/P EST LOW 20 MIN: CPT | Mod: PBBFAC | Performed by: OBSTETRICS & GYNECOLOGY

## 2024-08-09 PROCEDURE — 99999 PR PBB SHADOW E&M-EST. PATIENT-LVL III: CPT | Mod: PBBFAC,,, | Performed by: OBSTETRICS & GYNECOLOGY

## 2024-08-09 RX ORDER — DUPILUMAB 300 MG/2ML
INJECTION, SOLUTION SUBCUTANEOUS
COMMUNITY

## 2024-08-09 RX ORDER — LEVONORGESTREL AND ETHINYL ESTRADIOL 0.15-0.03
1 KIT ORAL DAILY
Qty: 84 TABLET | Refills: 4 | Status: SHIPPED | OUTPATIENT
Start: 2024-08-09

## 2024-08-09 RX ORDER — NEOMYCIN SULFATE, POLYMYXIN B SULFATE AND HYDROCORTISONE 10; 3.5; 1 MG/ML; MG/ML; [USP'U]/ML
SUSPENSION/ DROPS AURICULAR (OTIC)
COMMUNITY
Start: 2024-03-13

## 2024-08-28 ENCOUNTER — PATIENT MESSAGE (OUTPATIENT)
Dept: OBSTETRICS AND GYNECOLOGY | Facility: CLINIC | Age: 34
End: 2024-08-28
Payer: MEDICAID

## 2024-08-29 ENCOUNTER — TELEPHONE (OUTPATIENT)
Dept: OBSTETRICS AND GYNECOLOGY | Facility: CLINIC | Age: 34
End: 2024-08-29
Payer: MEDICAID

## 2024-08-29 NOTE — TELEPHONE ENCOUNTER
----- Message from Jadiel Angeles MA sent at 8/28/2024  1:10 PM CDT -----    ----- Message -----  From: OMID Rodriguez MD  Sent: 8/28/2024   1:10 PM CDT  To: [facility] Staff    Please schedule her for colposcopy.  Thanks.

## 2024-08-31 DIAGNOSIS — Z30.41 ENCOUNTER FOR SURVEILLANCE OF CONTRACEPTIVE PILLS: ICD-10-CM

## 2024-08-31 DIAGNOSIS — N92.0 MENORRHAGIA WITH REGULAR CYCLE: ICD-10-CM

## 2024-08-31 NOTE — TELEPHONE ENCOUNTER
Refill Routing Note   Medication(s) are not appropriate for processing by [facility] for the following reason(s):        Required vitals outdated    [facility] action(s):  Defer        Medication Therapy Plan: FOV with Dr. Short      Appointments  past 12m or future 3m with PCP    Date Provider   Last Visit   8/9/2024 OMID Rodriguez MD   Next Visit   Visit date not found OIMD Rodriguez MD   ED visits in past 90 days: 0        Note composed:9:38 AM 08/31/2024

## 2024-09-03 RX ORDER — LEVONORGESTREL AND ETHINYL ESTRADIOL 0.15-0.03
1 KIT ORAL
Qty: 84 TABLET | Refills: 4 | Status: SHIPPED | OUTPATIENT
Start: 2024-09-03

## 2024-09-12 PROBLEM — K80.20 GALLSTONES: Status: RESOLVED | Noted: 2017-07-05 | Resolved: 2024-09-12

## 2024-09-12 PROBLEM — F32.A DEPRESSION: Status: ACTIVE | Noted: 2020-08-14

## 2024-09-12 PROBLEM — L73.2 HIDRADENITIS SUPPURATIVA: Status: ACTIVE | Noted: 2018-09-25

## 2024-09-12 PROBLEM — G82.50 SPASTIC QUADRIPARESIS: Status: ACTIVE | Noted: 2020-08-14

## 2024-09-12 PROBLEM — K22.2 ESOPHAGEAL STRICTURE: Status: ACTIVE | Noted: 2018-09-25

## 2024-09-12 PROBLEM — K21.9 ESOPHAGEAL REFLUX: Status: ACTIVE | Noted: 2018-09-25

## 2024-09-12 PROBLEM — G80.9 CEREBRAL PALSY: Status: ACTIVE | Noted: 2018-09-25

## 2024-10-09 ENCOUNTER — TELEPHONE (OUTPATIENT)
Dept: OBSTETRICS AND GYNECOLOGY | Facility: CLINIC | Age: 34
End: 2024-10-09
Payer: MEDICAID

## 2024-10-09 NOTE — TELEPHONE ENCOUNTER
----- Message from OMID Rodriguez MD sent at 8/28/2024  1:09 PM CDT -----  Regarding: Colpo  Needs colposcopy if not already scheduled.

## 2025-03-18 ENCOUNTER — PATIENT MESSAGE (OUTPATIENT)
Dept: OBSTETRICS AND GYNECOLOGY | Facility: CLINIC | Age: 35
End: 2025-03-18
Payer: MEDICAID

## 2025-03-28 NOTE — ED PROVIDER NOTES
"Encounter Date: 1/25/2018    SCRIBE #1 NOTE: I, Carmen Carr, am scribing for, and in the presence of,  Dr. Mann. I have scribed the entire note.       History     Chief Complaint   Patient presents with    Abdominal Pain     Abdominal pain that began approximately 1 hour, sister reports pt has not had a BM since Sunday, seen by PCP at [facility] yesterday for "funny feeling in her stomach" who did labs at [facility]. Hx of CP, pt nonverbal in triage, no distress noted.      The patient is a 27 y.o. female with co-morbidities including: GERD, cerebral palsy, and scoliosis who presents to the ED with a complaint of epigastric abdominal pain that began one hour PTA.  Sister reports around five or six days ago, her stomach "felt uncomfortable."  Patient saw PCP yesterday, where she received labs.  Doctor said patient's intestine was inflamed.  Last BM was four days ago, which is abnormal.  Denies difficulty urinating, fevers, chills, or change in appetite.  Patient had a cholecystectomy in July 2017.      The history is provided by the patient, a relative and medical records.     Review of patient's allergies indicates:   Allergen Reactions    Shellfish containing products Anaphylaxis    Pcn [penicillins]      Hives     Past Medical History:   Diagnosis Date    Cerebral palsy     GERD (gastroesophageal reflux disease)     Scoliosis      Past Surgical History:   Procedure Laterality Date    CHOLECYSTECTOMY      HIP SURGERY  2000    Patient has surgery to straighten leg bone.     HIP SURGERY      vascular pump      WISDOM TOOTH EXTRACTION Bilateral 2016     Family History   Problem Relation Age of Onset    Diabetes Maternal Grandmother     Heart disease Maternal Grandmother      Social History   Substance Use Topics    Smoking status: Never Smoker    Smokeless tobacco: Never Used    Alcohol use No     Review of Systems   Constitutional: Negative for appetite change, chills and fever.   HENT: Negative for ear pain " and nosebleeds.    Eyes: Negative for photophobia and pain.   Respiratory: Negative for shortness of breath and wheezing.    Cardiovascular: Negative for chest pain.   Gastrointestinal: Positive for abdominal pain and constipation.   Genitourinary: Negative for difficulty urinating and dysuria.   Musculoskeletal: Negative for neck pain and neck stiffness.   Skin: Negative for rash.   Neurological: Negative for light-headedness and headaches.       Physical Exam     Initial Vitals [01/25/18 2239]   BP Pulse Resp Temp SpO2   130/73 84 17 98.4 °F (36.9 °C) 96 %      MAP       92         Physical Exam    Nursing note and vitals reviewed.  Constitutional: She appears well-developed and well-nourished.   HENT:   Head: Normocephalic and atraumatic.   Eyes: Conjunctivae are normal.   Neck: Normal range of motion.   Pulmonary/Chest: Breath sounds normal. No respiratory distress.   Abdominal:   Epigastric tenderness, baclofen pump in LLQ.   Neurological: She is alert.   Skin: Skin is warm. No rash noted.         ED Course   Procedures  Labs Reviewed   CBC W/ AUTO DIFFERENTIAL - Abnormal; Notable for the following:        Result Value    Hematocrit 35.8 (*)     Gran # (ANC) 1.6 (*)     Eos # 0.6 (*)     Gran% 20.9 (*)     Lymph% 56.0 (*)     Eosinophil% 8.2 (*)     All other components within normal limits   COMPREHENSIVE METABOLIC PANEL   LIPASE             Medical Decision Making:   History:   Old Medical Records: I decided to obtain old medical records.  Initial Assessment:   Evaluation of epigastric abdominal pain and constipation.  Concern is for gastritis, constipation, ileus obstruction.  Will evaluate with labs and x-ray.  Independently Interpreted Test(s):   I have ordered and independently interpreted X-rays - see prior notes.  Clinical Tests:   Lab Tests: Ordered and Reviewed  Radiological Study: Ordered and Reviewed            Scribe Attestation:   Scribe #1: I performed the above scribed service and the  documentation accurately describes the services I performed. I attest to the accuracy of the note.    Attending Attestation:             Attending ED Notes:   Xray concerning for constipation. Will start protonix and miralax daily. Recommend follow up with PCP.           ED Course      Clinical Impression:   The primary encounter diagnosis was Constipation, unspecified constipation type. A diagnosis of Epigastric pain was also pertinent to this visit.    Disposition:   Disposition: Discharged                        Jorge Mann MD  01/29/18 3991     [FreeTextEntry1] : Father: Depression (not formerly diagnosed), anger, alcohol abuse \par  Mother: Anxiety, Borderline Personality Disorder.\par  No History of Suicide attempts in the family.

## 2025-05-15 ENCOUNTER — HOSPITAL ENCOUNTER (EMERGENCY)
Facility: HOSPITAL | Age: 35
Discharge: HOME | End: 2025-05-15
Attending: EMERGENCY MEDICINE
Payer: MEDICAID

## 2025-05-15 VITALS
HEIGHT: 65 IN | RESPIRATION RATE: 20 BRPM | DIASTOLIC BLOOD PRESSURE: 72 MMHG | HEART RATE: 82 BPM | WEIGHT: 110 LBS | OXYGEN SATURATION: 99 % | SYSTOLIC BLOOD PRESSURE: 117 MMHG | BODY MASS INDEX: 18.33 KG/M2 | TEMPERATURE: 98 F

## 2025-05-15 DIAGNOSIS — R07.9 CHEST PAIN: ICD-10-CM

## 2025-05-15 DIAGNOSIS — M79.18 MUSCULOSKELETAL PAIN: Primary | ICD-10-CM

## 2025-05-15 DIAGNOSIS — M54.9 BACK PAIN, UNSPECIFIED BACK LOCATION, UNSPECIFIED BACK PAIN LATERALITY, UNSPECIFIED CHRONICITY: ICD-10-CM

## 2025-05-15 DIAGNOSIS — R11.0 NAUSEA: ICD-10-CM

## 2025-05-15 LAB
ABSOLUTE EOSINOPHIL (OHS): 0.22 K/UL
ABSOLUTE MONOCYTE (OHS): 1.09 K/UL (ref 0.3–1)
ABSOLUTE NEUTROPHIL COUNT (OHS): 2.73 K/UL (ref 1.8–7.7)
ALBUMIN SERPL BCP-MCNC: 3.6 G/DL (ref 3.5–5.2)
ALP SERPL-CCNC: 57 UNIT/L (ref 40–150)
ALT SERPL W/O P-5'-P-CCNC: <5 UNIT/L (ref 10–44)
ANION GAP (OHS): 11 MMOL/L (ref 8–16)
AST SERPL-CCNC: 22 UNIT/L (ref 11–45)
B-HCG UR QL: NEGATIVE
BACTERIA #/AREA URNS AUTO: NORMAL /HPF
BASOPHILS # BLD AUTO: 0.09 K/UL
BASOPHILS NFR BLD AUTO: 1.2 %
BILIRUB SERPL-MCNC: 0.3 MG/DL (ref 0.1–1)
BILIRUB UR QL STRIP.AUTO: NEGATIVE
BNP SERPL-MCNC: <10 PG/ML (ref 0–99)
BUN SERPL-MCNC: 10 MG/DL (ref 6–20)
CALCIUM SERPL-MCNC: 9.3 MG/DL (ref 8.7–10.5)
CHLORIDE SERPL-SCNC: 105 MMOL/L (ref 95–110)
CLARITY UR: CLEAR
CO2 SERPL-SCNC: 25 MMOL/L (ref 23–29)
COLOR UR AUTO: YELLOW
CREAT SERPL-MCNC: 0.7 MG/DL (ref 0.5–1.4)
CTP QC/QA: YES
D DIMER PPP IA.FEU-MCNC: 0.42 MG/L FEU
ERYTHROCYTE [DISTWIDTH] IN BLOOD BY AUTOMATED COUNT: 15.9 % (ref 11.5–14.5)
GFR SERPLBLD CREATININE-BSD FMLA CKD-EPI: >60 ML/MIN/1.73/M2
GLUCOSE SERPL-MCNC: 86 MG/DL (ref 70–110)
GLUCOSE UR QL STRIP: NEGATIVE
HCT VFR BLD AUTO: 30.4 % (ref 37–48.5)
HGB BLD-MCNC: 9 GM/DL (ref 12–16)
HGB UR QL STRIP: NEGATIVE
HOLD SPECIMEN: NORMAL
IMM GRANULOCYTES # BLD AUTO: 0.02 K/UL (ref 0–0.04)
IMM GRANULOCYTES NFR BLD AUTO: 0.3 % (ref 0–0.5)
KETONES UR QL STRIP: NEGATIVE
LEUKOCYTE ESTERASE UR QL STRIP: ABNORMAL
LIPASE SERPL-CCNC: 42 U/L (ref 4–60)
LYMPHOCYTES # BLD AUTO: 3.58 K/UL (ref 1–4.8)
MCH RBC QN AUTO: 20.8 PG (ref 27–31)
MCHC RBC AUTO-ENTMCNC: 29.6 G/DL (ref 32–36)
MCV RBC AUTO: 70 FL (ref 82–98)
MICROSCOPIC COMMENT: NORMAL
NITRITE UR QL STRIP: NEGATIVE
NUCLEATED RBC (/100WBC) (OHS): 0 /100 WBC
PH UR STRIP: 7 [PH]
PLATELET # BLD AUTO: 423 K/UL (ref 150–450)
PMV BLD AUTO: 10.1 FL (ref 9.2–12.9)
POTASSIUM SERPL-SCNC: 4 MMOL/L (ref 3.5–5.1)
PROT SERPL-MCNC: 8.1 GM/DL (ref 6–8.4)
PROT UR QL STRIP: NEGATIVE
RBC # BLD AUTO: 4.32 M/UL (ref 4–5.4)
RBC #/AREA URNS AUTO: 2 /HPF (ref 0–4)
RELATIVE EOSINOPHIL (OHS): 2.8 %
RELATIVE LYMPHOCYTE (OHS): 46.3 % (ref 18–48)
RELATIVE MONOCYTE (OHS): 14.1 % (ref 4–15)
RELATIVE NEUTROPHIL (OHS): 35.3 % (ref 38–73)
SODIUM SERPL-SCNC: 141 MMOL/L (ref 136–145)
SP GR UR STRIP: 1.03
SQUAMOUS #/AREA URNS AUTO: 2 /HPF
TROPONIN I SERPL HS-MCNC: <3 NG/L
UROBILINOGEN UR STRIP-ACNC: ABNORMAL EU/DL
WBC # BLD AUTO: 7.73 K/UL (ref 3.9–12.7)
WBC #/AREA URNS AUTO: 1 /HPF (ref 0–5)

## 2025-05-15 PROCEDURE — 85025 COMPLETE CBC W/AUTO DIFF WBC: CPT | Performed by: EMERGENCY MEDICINE

## 2025-05-15 PROCEDURE — 63600175 PHARM REV CODE 636 W HCPCS

## 2025-05-15 PROCEDURE — 81025 URINE PREGNANCY TEST: CPT | Performed by: EMERGENCY MEDICINE

## 2025-05-15 PROCEDURE — 99285 EMERGENCY DEPT VISIT HI MDM: CPT | Mod: 25

## 2025-05-15 PROCEDURE — 85379 FIBRIN DEGRADATION QUANT: CPT

## 2025-05-15 PROCEDURE — 63600175 PHARM REV CODE 636 W HCPCS: Mod: JZ,TB | Performed by: EMERGENCY MEDICINE

## 2025-05-15 PROCEDURE — 93005 ELECTROCARDIOGRAM TRACING: CPT

## 2025-05-15 PROCEDURE — 83880 ASSAY OF NATRIURETIC PEPTIDE: CPT | Performed by: EMERGENCY MEDICINE

## 2025-05-15 PROCEDURE — 80053 COMPREHEN METABOLIC PANEL: CPT | Performed by: EMERGENCY MEDICINE

## 2025-05-15 PROCEDURE — 84484 ASSAY OF TROPONIN QUANT: CPT | Performed by: EMERGENCY MEDICINE

## 2025-05-15 PROCEDURE — 81001 URINALYSIS AUTO W/SCOPE: CPT | Performed by: EMERGENCY MEDICINE

## 2025-05-15 PROCEDURE — 93010 ELECTROCARDIOGRAM REPORT: CPT | Mod: ,,, | Performed by: INTERNAL MEDICINE

## 2025-05-15 PROCEDURE — 96374 THER/PROPH/DIAG INJ IV PUSH: CPT

## 2025-05-15 PROCEDURE — 96375 TX/PRO/DX INJ NEW DRUG ADDON: CPT

## 2025-05-15 PROCEDURE — 83690 ASSAY OF LIPASE: CPT | Performed by: EMERGENCY MEDICINE

## 2025-05-15 RX ORDER — ONDANSETRON HYDROCHLORIDE 2 MG/ML
4 INJECTION, SOLUTION INTRAVENOUS
Status: DISCONTINUED | OUTPATIENT
Start: 2025-05-15 | End: 2025-05-15

## 2025-05-15 RX ORDER — DIAZEPAM 10 MG/2ML
5 INJECTION INTRAMUSCULAR
Status: COMPLETED | OUTPATIENT
Start: 2025-05-15 | End: 2025-05-15

## 2025-05-15 RX ORDER — KETOROLAC TROMETHAMINE 30 MG/ML
10 INJECTION, SOLUTION INTRAMUSCULAR; INTRAVENOUS
Status: COMPLETED | OUTPATIENT
Start: 2025-05-15 | End: 2025-05-15

## 2025-05-15 RX ORDER — ONDANSETRON HYDROCHLORIDE 2 MG/ML
4 INJECTION, SOLUTION INTRAVENOUS
Status: COMPLETED | OUTPATIENT
Start: 2025-05-15 | End: 2025-05-15

## 2025-05-15 RX ADMIN — KETOROLAC TROMETHAMINE 10 MG: 30 INJECTION, SOLUTION INTRAMUSCULAR; INTRAVENOUS at 09:05

## 2025-05-15 RX ADMIN — ONDANSETRON 4 MG: 2 INJECTION INTRAMUSCULAR; INTRAVENOUS at 09:05

## 2025-05-15 RX ADMIN — DIAZEPAM 5 MG: 5 INJECTION, SOLUTION INTRAMUSCULAR; INTRAVENOUS at 09:05

## 2025-05-15 NOTE — FIRST PROVIDER EVALUATION
"Medical screening examination initiated.  I have conducted a focused provider triage encounter, findings are as follows:    Brief history of present illness:  35yo F hx CP, spastic quadriparesis presenting with nausea with chest and back pain with palpation. No vomiting.     Vitals:    05/15/25 1703   BP: 127/75   Pulse: 103   Resp: 20   Temp: 98.8 °F (37.1 °C)   TempSrc: Oral   SpO2: 97%   Weight: 49.9 kg (110 lb)   Height: 5' 5" (1.651 m)       Pertinent physical exam:  increased spasticity, NAD    Brief workup plan:  cardiac w/u, lipase, UA    Preliminary workup initiated; this workup will be continued and followed by the physician or advanced practice provider that is assigned to the patient when roomed.  "

## 2025-05-16 LAB
OHS QRS DURATION: 80 MS
OHS QTC CALCULATION: 428 MS

## 2025-05-16 NOTE — ED PROVIDER NOTES
"Encounter Date: 5/15/2025       History     Chief Complaint   Patient presents with    Chest Pain     Chest pain and upper back pain, cerebral palsy     Christin Marquezsin, 33 y/o F w/Pmhx of cerebral palsy and spastic quadriparesis presenting with nausea, chest pain and back pain. Nausea started on Tuesday. Denies any vomiting, abdominal pain, fevers, sick contacts, dysuria, and constipation/diarrhea. Chest and back pain started yesterday. Describes both chest and back pain as soreness and reproducible on palpation and present with deep inspiration. No radiation to chest pain. No trauma to chest and back. Reports the pain as constant and present on examination. Chest pain present on medial outermost aspects of her breasts. Back pain present directly opposite from her chest pain.           Review of patient's allergies indicates:   Allergen Reactions    Penicillins Anaphylaxis     Hives  Other reaction(s): Unknown  Hives      Shellfish containing products Anaphylaxis    Omeprazole Other (See Comments)     Other reaction(s): Unknown  "It made me paranoid and depressed."      Iodine Itching and Rash     Past Medical History:   Diagnosis Date    Cerebral palsy     Depression 8/14/2020    GERD (gastroesophageal reflux disease)     Scoliosis      Past Surgical History:   Procedure Laterality Date    CHOLECYSTECTOMY      HIP SURGERY  2000    Patient has surgery to straighten leg bone.     HIP SURGERY      vascular pump      WISDOM TOOTH EXTRACTION Bilateral 2016     Family History   Problem Relation Name Age of Onset    Diabetes Maternal Grandmother      Heart disease Maternal Grandmother      Ovarian cancer Paternal Aunt      Breast cancer Paternal Cousin      Colon cancer Neg Hx       Social History[1]  Review of Systems    Physical Exam     Initial Vitals [05/15/25 1703]   BP Pulse Resp Temp SpO2   127/75 103 20 98.8 °F (37.1 °C) 97 %      MAP       --         Physical Exam    Nursing note and vitals " reviewed.  Constitutional: She appears well-developed and well-nourished.   HENT:   Head: Normocephalic and atraumatic.   Eyes: EOM are normal.   Cardiovascular:  Normal rate and regular rhythm.           Tenderness to palpation to medial outermost aspect of her breasts   Pulmonary/Chest: Breath sounds normal. No respiratory distress.   Abdominal: Abdomen is soft. There is no abdominal tenderness. There is no rebound and no guarding.   Musculoskeletal:        Arms:       Comments: Tenderness to palpation directly posterior to chest pain     Neurological: She is alert.         ED Course   Procedures  Labs Reviewed   COMPREHENSIVE METABOLIC PANEL - Abnormal       Result Value    Sodium 141      Potassium 4.0      Chloride 105      CO2 25      Glucose 86      BUN 10      Creatinine 0.7      Calcium 9.3      Protein Total 8.1      Albumin 3.6      Bilirubin Total 0.3      ALP 57      AST 22      ALT <5 (*)     Anion Gap 11      eGFR >60     URINALYSIS, REFLEX TO URINE CULTURE - Abnormal    Color, UA Yellow      Appearance, UA Clear      pH, UA 7.0      Spec Grav UA 1.030      Protein, UA Negative      Glucose, UA Negative      Ketones, UA Negative      Bilirubin, UA Negative      Blood, UA Negative      Nitrites, UA Negative      Urobilinogen, UA 2.0-3.0 (*)     Leukocyte Esterase, UA Trace (*)    CBC WITH DIFFERENTIAL - Abnormal    WBC 7.73      RBC 4.32      HGB 9.0 (*)     HCT 30.4 (*)     MCV 70 (*)     MCH 20.8 (*)     MCHC 29.6 (*)     RDW 15.9 (*)     Platelet Count 423      MPV 10.1      Nucleated RBC 0      Neut % 35.3 (*)     Lymph % 46.3      Mono % 14.1      Eos % 2.8      Basophil % 1.2      Imm Grans % 0.3      Neut # 2.73      Lymph # 3.58      Mono # 1.09 (*)     Eos # 0.22      Baso # 0.09      Imm Grans # 0.02     TROPONIN I HIGH SENSITIVITY - Normal    Troponin High Sensitive <3     B-TYPE NATRIURETIC PEPTIDE - Normal    BNP <10     LIPASE - Normal    Lipase Level 42     D DIMER, QUANTITATIVE - Normal     D-Dimer 0.42     CBC W/ AUTO DIFFERENTIAL    Narrative:     The following orders were created for panel order CBC auto differential.  Procedure                               Abnormality         Status                     ---------                               -----------         ------                     CBC with Differential[[id number]]       Abnormal            Final result                 Please view results for these tests on the individual orders.   GREY TOP URINE HOLD    Extra Tube Hold for add-ons.     URINALYSIS MICROSCOPIC    RBC, UA 2      WBC, UA 1      Bacteria, UA Rare      Squamous Epithelial Cells, UA 2      Microscopic Comment       POCT URINE PREGNANCY    POC Preg Test, Ur Negative       Acceptable Yes            Imaging Results              X-Ray Chest AP Portable (Final result)  Result time 05/15/25 23:46:49      Final result by Adolfo Garrett DO (05/15/25 23:46:49)                   Impression:      No acute abnormality.      Electronically signed by: Adolfo Garrett  Date:    05/15/2025  Time:    23:46               Narrative:    EXAMINATION:  XR CHEST AP PORTABLE    CLINICAL HISTORY:  Chest Pain;    TECHNIQUE:  Single frontal view of the chest was performed.    COMPARISON:  04/01/2023.    FINDINGS:  The lungs are well expanded and clear. No focal opacities are seen. The pleural spaces are clear. The cardiac silhouette is unremarkable. The visualized osseous structures are unremarkable.                        Wet Read by Jorge Winston MD (05/15/25 23:01:06, [facility] - Emergency Dept, Emergency Medicine)    No acute cardiopulmonary disease                                      Medications   ketorolac injection 9.999 mg (9.999 mg Intravenous Given 5/15/25 2114)   diazePAM injection 5 mg (5 mg Intravenous Given 5/15/25 2114)   ondansetron injection 4 mg (4 mg Intravenous Given 5/15/25 2115)     Medical Decision Making  Amount and/or Complexity of Data Reviewed  Labs:   Decision-making details documented in ED Course.  Radiology: independent interpretation performed. Decision-making details documented in ED Course.  ECG/medicine tests:  Decision-making details documented in ED Course.    Risk  Prescription drug management.              Attending Attestation:   Physician Attestation Statement for Resident:  As the supervising MD   Physician Attestation Statement: I have personally seen and examined this patient.   I agree with the above history.  -:   As the supervising MD I agree with the above PE.     As the supervising MD I agree with the above treatment, course, plan, and disposition.   -: Chest pain all across the upper chest and a bandlike distribution along with back pain at a similar level in a similar distribution.  Both pains worsened with movement and inspiration.  Tachycardic on arrival.  EKG and cardiac workup entirely benign.  D-dimer negative.  Chest x-ray negative.  I think this is likely musculoskeletal pain.  Highly doubt ACS.  I do not feel further cardiac testing is indicated.  PE has been ruled out.  Okay to discharge.                   ED Course as of 05/16/25 1107   Thu May 15, 2025   2016 X-Ray Chest AP Portable  CXR shows no acute disease per my independent interpretation.   [DC]   2017 EKG 12-lead  Sinus tachycardia, no acute ischemia, similar morphology to previous per my independent interpretation.   [DC]   2018 Troponin I High Sensitivity: <3 [DC]   2018 Lipase: 42 [DC]   2018 BNP: <10 [DC]   2018 WBC: 7.73 [DC]   2018 Hemoglobin(!): 9.0  Heavy periods per family [DC]   2018 Platelet Count: 423 [DC]   2018 Creatinine: 0.7 [DC]      ED Course User Index  [DC] George Le MD                           Clinical Impression:  Final diagnoses:  [R07.9] Chest pain  [M79.18] Musculoskeletal pain (Primary)  [R11.0] Nausea  [M54.9] Back pain, unspecified back location, unspecified back pain laterality, unspecified chronicity          ED Disposition Condition     Discharge Stable          ED Prescriptions    None       Follow-up Information       Follow up With Specialties Details Why Contact Info    Claudia [facility] MD Family Medicine In 1 week  [address] [facility]  [address]  [facility]  [facility] [address]  [phone]      [facility] - Emergency Dept Emergency Medicine  Return to ED for worsening symptoms, inability to eat/drink, fever greater than 100.4, or any other concerns. [address] [facility] [address] [phone]                 [1]   Social History  Tobacco Use    Smoking status: Never    Smokeless tobacco: Never   Substance Use Topics    Alcohol use: No    Drug use: No        George Le MD  05/16/25 1617

## 2025-05-16 NOTE — ED NOTES
Patient turned over to me by Dr. Le at 2100    Briefly:   34-year-old female presents with chest and back pain.  Awaiting D-dimer.    D-dimer is not elevated.  Heart rate 84.  Doubt ACS or PE.  No evidence of pneumonia on chest x-ray.  Her chest and back pain is reproducible on exam.  Suspect musculoskeletal etiology.  Will discharge home.  Patient will take motrin and tylenol over the counter as directed on packaging.     Patient will return to ED for worsening symptoms, inability to eat/drink, fever greater than 100.4, or any other concerns. Did bedside teaching with return precautions.  All questions answered.  Mom acknowledges understanding.  Gave written and verbal discharge instructions.      Jorge Winston MD  05/15/25 2644

## 2025-05-16 NOTE — ED TRIAGE NOTES
Pt presents to the ED c/o chest pressure and nausea x2 days. Pt's family states she has been feeling chest pressure with palpation and with deep inspiration. Pt denies vomiting, diarrhea, fevers, and chills.

## 2025-06-12 ENCOUNTER — PROCEDURE VISIT (OUTPATIENT)
Dept: OBSTETRICS AND GYNECOLOGY | Facility: CLINIC | Age: 35
End: 2025-06-12
Attending: OBSTETRICS & GYNECOLOGY
Payer: MEDICAID

## 2025-06-12 VITALS — DIASTOLIC BLOOD PRESSURE: 76 MMHG | SYSTOLIC BLOOD PRESSURE: 122 MMHG

## 2025-06-12 DIAGNOSIS — R87.619 ABNORMAL PAP SMEAR OF CERVIX: ICD-10-CM

## 2025-06-12 DIAGNOSIS — Z76.89 ENCOUNTER FOR BIOPSY: ICD-10-CM

## 2025-06-12 DIAGNOSIS — B97.7 HIGH RISK HPV INFECTION: Primary | ICD-10-CM

## 2025-06-12 DIAGNOSIS — G80.9 CEREBRAL PALSY, UNSPECIFIED TYPE: ICD-10-CM

## 2025-06-12 LAB
B-HCG UR QL: NEGATIVE
CTP QC/QA: YES

## 2025-06-12 PROCEDURE — 99999PBSHW POCT URINE PREGNANCY: Mod: PBBFAC,,,

## 2025-06-12 PROCEDURE — 81025 URINE PREGNANCY TEST: CPT | Mod: PBBFAC | Performed by: OBSTETRICS & GYNECOLOGY

## 2025-06-12 RX ORDER — FAMOTIDINE 20 MG/1
20 TABLET, FILM COATED ORAL
OUTPATIENT
Start: 2025-06-12

## 2025-06-12 RX ORDER — IBUPROFEN 600 MG/1
600 TABLET, FILM COATED ORAL 2 TIMES DAILY
COMMUNITY
Start: 2025-05-27

## 2025-06-12 RX ORDER — SODIUM CHLORIDE 9 MG/ML
INJECTION, SOLUTION INTRAVENOUS CONTINUOUS
OUTPATIENT
Start: 2025-06-12

## 2025-06-12 RX ORDER — MUPIROCIN 20 MG/G
OINTMENT TOPICAL
OUTPATIENT
Start: 2025-06-12

## 2025-06-12 NOTE — PROGRESS NOTES
Past medical, surgical, social, family, and obstetric histories; medications; prior records and results; and available outside records were reviewed and updated in the EMR.  Pertinent findings were noted below.    Reason for Visit   No chief complaint on file.    HPI   34 y.o., No obstetric history on file.    Patient's last menstrual period was 06/04/2025.    Patient presented to clinic for colposcopy 2/2 persistent HR other HPV + on Pap smear. Pap history as below. She has PMH of cerebral palsy and has limited mobility. Attempted repeat co-testing and colposcopy as there has been almost a year since most recent Pap, patient could not tolerate speculum exam due to pain & discomfort. Decision made to scheduled procedure in OR under anesthesia.     Pap History:  3/2023: NILM, HPV HR other +  8/2024: NILM, HPV HR other +    Review of Systems   Constitutional:  Negative for chills and fever.   HENT:  Negative for nasal congestion and mouth sores.    Eyes:  Negative for visual disturbance.   Respiratory:  Negative for cough and shortness of breath.    Cardiovascular:  Negative for chest pain, palpitations and leg swelling.   Gastrointestinal:  Negative for constipation, diarrhea, nausea and vomiting.   Genitourinary:  Negative for dysuria, pelvic pain, vaginal bleeding and vaginal discharge.   Musculoskeletal:  Negative for joint swelling.   Integumentary:  Negative for rash.   Neurological:  Negative for syncope, numbness and headaches.   Psychiatric/Behavioral:  The patient is not nervous/anxious.        Past Medical History:   Diagnosis Date    Cerebral palsy     Depression 8/14/2020    GERD (gastroesophageal reflux disease)     Scoliosis      Past Surgical History:   Procedure Laterality Date    CHOLECYSTECTOMY      HIP SURGERY  2000    Patient has surgery to straighten leg bone.     HIP SURGERY      vascular pump      WISDOM TOOTH EXTRACTION Bilateral 2016       Social History     Tobacco Use    Smoking status:  Never    Smokeless tobacco: Never   Substance Use Topics    Alcohol use: No     Family History   Problem Relation Name Age of Onset    Diabetes Maternal Grandmother      Heart disease Maternal Grandmother      Ovarian cancer Paternal Aunt      Breast cancer Paternal Cousin      Colon cancer Neg Hx       OB History   No obstetric history on file.       Current Outpatient Medications   Medication Instructions    budesonide 1 mg/2 mL NbSp 1 mL oral Once a day for 90 days    calcium-vitamin D3 500 mg(1,250mg) -200 unit per tablet 1 tablet, Oral, Daily    carbidopa-levodopa  mg (SINEMET)  mg per tablet 1 tablet, Oral, 3 times daily    ciprofloxacin-dexAMETHasone 0.3-0.1% (CIPRODEX) 0.3-0.1 % DrpS SHAKE LIQUID AND INSTILL 4 DROPS TO LEFT EAR TWICE DAILY FOR 7 DAYS    diazePAM (VALIUM) 2 mg, Oral, 3 times daily    DUPIXENT  mg/2 mL PnIj Subcutaneous    EPINEPHrine (EPIPEN) 0.3 mg/0.3 mL AtIn as directed    ibuprofen (ADVIL,MOTRIN) 600 mg, 2 times daily    levocetirizine (XYZAL) 5 mg, Oral    levonorgestrel-ethinyl estradiol (KURVELO, 28,) 0.15-0.03 mg per tablet 1 tablet, Oral    LIDOcaine (LIDODERM) 5 % 1 patch, Every morning    methocarbamoL (ROBAXIN) 750 mg, Oral, 3 times daily    mupirocin (BACTROBAN) 2 % ointment SMARTSI Application Topical 2-3 Times Daily    neomycin-polymyxin-hydrocortisone (CORTISPORIN) 3.5-10,000-1 mg/mL-unit/mL-% otic suspension SMARTSIG:Left Ear    pantoprazole (PROTONIX) 40 mg, Every morning    polyethylene glycol (GLYCOLAX) 17 g, Oral, Daily    QUEtiapine (SEROQUEL) 25 MG Tab Take by mouth.    sertraline (ZOLOFT) 100 mg, Oral, Patient state she take 150mg       Penicillins, Shellfish containing products, Omeprazole, and Iodine    Exam   /76   LMP 2025     Physical Exam  Constitutional:       Appearance: Normal appearance.   HENT:      Head: Normocephalic and atraumatic.   Eyes:      Extraocular Movements: Extraocular movements intact.   Pulmonary:       Effort: Pulmonary effort is normal. No respiratory distress.   Abdominal:      General: Abdomen is flat. There is no distension.      Palpations: Abdomen is soft. There is no mass.      Tenderness: There is no abdominal tenderness. There is no guarding or rebound.   Musculoskeletal:         General: Normal range of motion.      Cervical back: Normal range of motion. No tenderness.   Neurological:      General: No focal deficit present.      Mental Status: She is alert and oriented to person, place, and time.   Skin:     General: Skin is warm and dry.   Psychiatric:         Mood and Affect: Mood normal.         Thought Content: Thought content normal.         Judgment: Judgment normal.   Vitals reviewed. Exam conducted with a chaperone present.         Assessment and Plan   There are no hospital problems to display for this patient.      Patient with PMH of cerebral palsy presents for colpo 2/2 persistent HR HPV + on Pap   Could not tolerate colposcopy + Pap in office, plan for EUA/Pap/Colpo in OR   Plan for procedure July 2 @ 1200  Case request & pre Op orders placed   Consents signed in clinic      Surgery counseling  - Pain and post-operative management of pain  - Bleeding and possible management for operative or post-operative bleeding  - Infection and possible management for post-operative infections  - Surgical injury, especially to bowel, bladder, or ureters and possible operative or post-operative management  - Anesthesia  - Possible blood transfusion including risks of reaction and infection  - DVT    Patient acknowledges risks and wishes to proceed with surgery.  All questions answered.  Consents signed.    Patient instructions  - To be NPO after midnight on the evening prior to surgery  - To arrive at the hospital 2 hours prior to her scheduled case     Elham Powers MD  OB/GYN PGY-2

## 2025-06-24 ENCOUNTER — ANESTHESIA EVENT (OUTPATIENT)
Dept: SURGERY | Facility: OTHER | Age: 35
End: 2025-06-24
Payer: MEDICAID

## 2025-06-24 NOTE — ANESTHESIA PREPROCEDURE EVALUATION
06/24/2025  Christin SUGGS Cousin is a 34 y.o., female.      Pre-op Assessment    I have reviewed the Patient Summary Reports.     I have reviewed the Nursing Notes. I have reviewed the NPO Status.   I have reviewed the Medications.     Review of Systems  Anesthesia Hx:             Denies Family Hx of Anesthesia complications.   Personal Hx of Anesthesia complications                    Social:  Non-Smoker       Hematology/Oncology:    Oncology Normal    -- Anemia (Hgb 9):               Hematology Comments: Superficial vein /cephalic thrombus right arm 05/2025 from IV. Not on anticoagulants. Pt reports it is still there                    EENT/Dental:  EENT/Dental Normal           Cardiovascular:  Cardiovascular Normal                                              Pulmonary:  Pulmonary Normal                       Renal/:  Renal/ Normal                 Hepatic/GI:     GERD, well controlled   H/o esophageal stricture, sister reports was due to allergies, reports after starting dupixent swallowing has been better             Musculoskeletal:     Scoliosis       Spine Disorders:             Neurological:  Neurology Normal          Cerebral palsy    Spastic quadriparesis                              Endocrine:  Endocrine Normal            Dermatological:  Skin Normal    Hidradenitis suppurativa    Psych:    depression                Physical Exam  General: Well nourished, Cooperative, Alert and Oriented    Airway:  Mallampati: III   Mouth Opening: Normal  TM Distance: Normal  Tongue: Normal  Neck ROM: Normal ROM    Dental:  Caps / Implants  Left upper tooth, filling fell out      Anesthesia Plan  Type of Anesthesia, risks & benefits discussed:    Anesthesia Type: Gen Supraglottic Airway, MAC  Intra-op Monitoring Plan: Standard ASA Monitors  Post Op Pain Control Plan: multimodal analgesia  Induction:  IV  ASA Score:  3  Day of Surgery Review of History & Physical: H&P Update referred to the surgeon/provider.  Anesthesia Plan Notes: CBC, BMP 05/2025 in EPIC  Superficial/cephalic thrombus right arm 05/2025 from IV. Not on anticoagulants. please use left arm for IV/ BP cuff  Takes valium 3x day, everyday, for spasms (will take DOS). no longer has baclofen pump  PONV, reports scop patch helps. Ordered for DOS    Ready For Surgery From Anesthesia Perspective.     .

## 2025-06-25 ENCOUNTER — HOSPITAL ENCOUNTER (OUTPATIENT)
Dept: PREADMISSION TESTING | Facility: OTHER | Age: 35
Discharge: HOME | End: 2025-06-25
Attending: OBSTETRICS & GYNECOLOGY
Payer: MEDICAID

## 2025-06-25 VITALS
TEMPERATURE: 98 F | SYSTOLIC BLOOD PRESSURE: 129 MMHG | HEART RATE: 91 BPM | HEIGHT: 65 IN | RESPIRATION RATE: 16 BRPM | OXYGEN SATURATION: 99 % | WEIGHT: 110 LBS | BODY MASS INDEX: 18.33 KG/M2 | DIASTOLIC BLOOD PRESSURE: 75 MMHG

## 2025-06-25 RX ORDER — SCOPOLAMINE 1 MG/3D
1 PATCH, EXTENDED RELEASE TRANSDERMAL
OUTPATIENT
Start: 2025-06-25

## 2025-06-25 RX ORDER — LIDOCAINE HYDROCHLORIDE 10 MG/ML
0.5 INJECTION, SOLUTION EPIDURAL; INFILTRATION; INTRACAUDAL; PERINEURAL ONCE
OUTPATIENT
Start: 2025-06-25 | End: 2025-06-25

## 2025-06-25 RX ORDER — LIDOCAINE HYDROCHLORIDE 10 MG/ML
0.5 INJECTION, SOLUTION EPIDURAL; INFILTRATION; INTRACAUDAL; PERINEURAL ONCE
Status: CANCELLED | OUTPATIENT
Start: 2025-06-25 | End: 2025-06-25

## 2025-06-25 RX ORDER — SCOPOLAMINE 1 MG/3D
1 PATCH, EXTENDED RELEASE TRANSDERMAL
Status: CANCELLED | OUTPATIENT
Start: 2025-06-25

## 2025-06-25 RX ORDER — SODIUM CHLORIDE, SODIUM LACTATE, POTASSIUM CHLORIDE, CALCIUM CHLORIDE 600; 310; 30; 20 MG/100ML; MG/100ML; MG/100ML; MG/100ML
INJECTION, SOLUTION INTRAVENOUS CONTINUOUS
Status: CANCELLED | OUTPATIENT
Start: 2025-06-25

## 2025-06-25 RX ORDER — SODIUM CHLORIDE, SODIUM LACTATE, POTASSIUM CHLORIDE, CALCIUM CHLORIDE 600; 310; 30; 20 MG/100ML; MG/100ML; MG/100ML; MG/100ML
INJECTION, SOLUTION INTRAVENOUS CONTINUOUS
OUTPATIENT
Start: 2025-06-25

## 2025-06-25 NOTE — DISCHARGE INSTRUCTIONS
Information to Prepare you for your Surgery    PRE-ADMIT TESTING   [address]  [place] [facility]  [address]     Your surgery has been scheduled at [facility]. We are pleased to have the opportunity to serve you. For Further Information please call [phone].    On the day of surgery please report to Registration on the 1st floor of the [facility].    CONTACT YOUR PHYSICIAN'S OFFICE THE DAY PRIOR TO YOUR SURGERY TO OBTAIN YOUR ARRIVAL TIME.     The evening before surgery do not eat anything after 9 p.m. ( this includes hard candy, chewing gum and mints).  You may only have GATORADE, POWERADE AND WATER  from 9 p.m. until you leave your home.   DO NOT DRINK ANY LIQUIDS ON THE WAY TO THE HOSPITAL.      Why does your anesthesiologist allow you to drink Gatorade/Powerade before surgery?  Gatorade/Powerade helps to increase your comfort before surgery and to decrease your nausea after surgery.   The carbohydrates in Gatorade/Powerade help reduce your body's stress response to surgery.  If you are a diabetic-drink only water prior to surgery.    Outpatient Surgery- May allow 2 adults (18 and older)/ Support Persons (1 being the designated ) for all surgical/procedural patients. A breastfeeding mother will be allowed her infant and 2 adult Support Persons. No one under the age of 18 will be allowed in the building.    MEDICATION INSTRUCTIONS: TAKE medications checked off by the Anesthesiologist on your Medication List.    Surgery Patients:  If you take ASPIRIN - Your PHYSICIAN/SURGEON will need to inform you IF/OR when you need to stop taking aspirin prior to your surgery.     Starting the week prior to surgery, do not take any medications containing IBUPROFEN or NSAIDS (Advil, Aleve, BC, Celebrex, Goody's, Ketorolac, Meloxicam, Mobic, Motrin, Naproxen, Toradol, etc).  If you are not sure if you should take a medicine please call your surgeon's office.  You may take Tylenol.    Do  Not Wear any make-up (especially eye make-up) to surgery. Please remove any false eyelashes or eyelash extensions. If you arrive the day of surgery with makeup/eyelashes on you will be required to remove prior to surgery. (There is a risk of corneal abrasions if eye makeup/eyelash extensions are not removed)    Leave all valuables at home.   Do Not wear any jewelry or watches, including any metal in body piercings. Jewelry must be removed prior to coming to the hospital.  There is a possibility that rings that are unable to be removed may be cut off if they are on the surgical extremity.    Please remove all hair extensions, wigs, clips and any other metal accessories/ ornaments from your hair.  These items may pose a flammable/fire risk in Surgery and must be removed.    Do not shave your surgical area at least 5 days prior to your surgery. The surgical prep will be performed at the hospital according to Infection Control regulations.    Contact Lens must be removed before surgery. Either do not wear the contact lens or bring a case and solution for storage.  Please bring a container for eyeglasses or dentures as required.  Bring any paperwork your physician has provided, such as consent forms,  history and physicals, doctor's orders, etc.   Bring comfortable clothes that are loose fitting to wear upon discharge. Take into consideration the type of surgery being performed.  Maintain your diet as advised per your physician the day prior to surgery.    Adequate rest the night before surgery is advised.   Park in the Parking lot behind the hospital or in the [facility] across the street from the parking lot. Parking is complimentary.  If you will be discharged the same day as your procedure, please arrange for a responsible adult to drive you home or to accompany you if traveling by taxi.   YOU WILL NOT BE PERMITTED TO DRIVE OR TO LEAVE THE HOSPITAL ALONE AFTER SURGERY.   If you are being discharged the  same day, it is strongly recommended that you arrange for someone to remain with you for the first 24 hrs following your surgery.    The Surgeon will speak to your family/visitor after your surgery regarding the outcome of your surgery and post op care.  The Surgeon may speak to you after your surgery, but there is a possibility you may not remember the details.  Please check with your family members regarding the conversation with the Surgeon.    We strongly recommend whoever is bringing you home be present for discharge instructions.  This will ensure a thorough understanding for your post op home care.              Bathing Instructions with Hibiclens  Shower the evening before and morning of your procedure with Chlorhexidine (Hibiclens)    Do not use Chlorhexidine on your face or genitals. Do not get in your eyes.  Wash your face with water and your regular face wash/soap  Use your regular shampoo  Apply Chlorhexidine (Hibiclens) directly on your skin or on a wet washcloth and wash gently. When showering: Move away from the shower stream when applying Chlorhexidine (Hibiclens) to avoid rinsing off too soon.  Rinse thoroughly with warm water  Do not dilute Chlorhexidine (Hibiclens)   Dry off as usual, do not use any deodorant, powder, body lotions, perfume, after shave or cologne.     If the patient has fever, cough, or signs/symptoms of Flu or Covid please do not come in for your surgery.   Contact your surgeon and your primary care physician for further instructions.   Please also call [facility] Outpatient Surgery [phone]. The unit opens at 5 AM.    If applicable, please bring your blood pressure & diabetes medications the day of surgery.

## 2025-07-01 ENCOUNTER — TELEPHONE (OUTPATIENT)
Dept: OBSTETRICS AND GYNECOLOGY | Facility: CLINIC | Age: 35
End: 2025-07-01
Payer: MEDICAID

## 2025-07-02 ENCOUNTER — ANESTHESIA (OUTPATIENT)
Dept: SURGERY | Facility: OTHER | Age: 35
End: 2025-07-02
Payer: MEDICAID

## 2025-07-02 ENCOUNTER — HOSPITAL ENCOUNTER (OUTPATIENT)
Facility: OTHER | Age: 35
Discharge: HOME | End: 2025-07-02
Attending: OBSTETRICS & GYNECOLOGY | Admitting: OBSTETRICS & GYNECOLOGY
Payer: MEDICAID

## 2025-07-02 ENCOUNTER — TELEPHONE (OUTPATIENT)
Dept: OBSTETRICS AND GYNECOLOGY | Facility: CLINIC | Age: 35
End: 2025-07-02
Payer: MEDICAID

## 2025-07-02 DIAGNOSIS — Z98.890 STATUS POST COLPOSCOPY: Primary | ICD-10-CM

## 2025-07-02 DIAGNOSIS — B97.7 HIGH RISK HPV INFECTION: ICD-10-CM

## 2025-07-02 DIAGNOSIS — R87.619 ABNORMAL PAP SMEAR OF CERVIX: ICD-10-CM

## 2025-07-02 LAB
B-HCG UR QL: NEGATIVE
CTP QC/QA: YES

## 2025-07-02 PROCEDURE — 25000003 PHARM REV CODE 250

## 2025-07-02 PROCEDURE — 36000706: Performed by: OBSTETRICS & GYNECOLOGY

## 2025-07-02 PROCEDURE — 25000003 PHARM REV CODE 250: Performed by: OBSTETRICS & GYNECOLOGY

## 2025-07-02 PROCEDURE — 88175 CYTOPATH C/V AUTO FLUID REDO: CPT | Mod: TC | Performed by: OBSTETRICS & GYNECOLOGY

## 2025-07-02 PROCEDURE — 37000009 HC ANESTHESIA EA ADD 15 MINS: Performed by: OBSTETRICS & GYNECOLOGY

## 2025-07-02 PROCEDURE — 87624 HPV HI-RISK TYP POOLED RSLT: CPT | Performed by: OBSTETRICS & GYNECOLOGY

## 2025-07-02 PROCEDURE — 88141 CYTOPATH C/V INTERPRET: CPT | Mod: ,,, | Performed by: PATHOLOGY

## 2025-07-02 PROCEDURE — 88305 TISSUE EXAM BY PATHOLOGIST: CPT | Mod: 26,,, | Performed by: PATHOLOGY

## 2025-07-02 PROCEDURE — 88342 IMHCHEM/IMCYTCHM 1ST ANTB: CPT | Mod: TC,91 | Performed by: OBSTETRICS & GYNECOLOGY

## 2025-07-02 PROCEDURE — 63600175 PHARM REV CODE 636 W HCPCS: Performed by: NURSE ANESTHETIST, CERTIFIED REGISTERED

## 2025-07-02 PROCEDURE — 81025 URINE PREGNANCY TEST: CPT

## 2025-07-02 PROCEDURE — 36000707: Performed by: OBSTETRICS & GYNECOLOGY

## 2025-07-02 PROCEDURE — 71000015 HC POSTOP RECOV 1ST HR: Performed by: OBSTETRICS & GYNECOLOGY

## 2025-07-02 PROCEDURE — 88342 IMHCHEM/IMCYTCHM 1ST ANTB: CPT | Mod: 26,,, | Performed by: PATHOLOGY

## 2025-07-02 PROCEDURE — 57454 BX/CURETT OF CERVIX W/SCOPE: CPT | Mod: ,,, | Performed by: OBSTETRICS & GYNECOLOGY

## 2025-07-02 PROCEDURE — 63600175 PHARM REV CODE 636 W HCPCS

## 2025-07-02 PROCEDURE — 37000008 HC ANESTHESIA 1ST 15 MINUTES: Performed by: OBSTETRICS & GYNECOLOGY

## 2025-07-02 PROCEDURE — 71000033 HC RECOVERY, INTIAL HOUR: Performed by: OBSTETRICS & GYNECOLOGY

## 2025-07-02 RX ORDER — GLUCAGON 1 MG
1 KIT INJECTION
Status: DISCONTINUED | OUTPATIENT
Start: 2025-07-02 | End: 2025-07-02 | Stop reason: HOSPADM

## 2025-07-02 RX ORDER — SODIUM CHLORIDE 0.9 % (FLUSH) 0.9 %
3 SYRINGE (ML) INJECTION
Status: DISCONTINUED | OUTPATIENT
Start: 2025-07-02 | End: 2025-07-02 | Stop reason: HOSPADM

## 2025-07-02 RX ORDER — SCOPOLAMINE 1 MG/3D
1 PATCH, EXTENDED RELEASE TRANSDERMAL
Status: DISCONTINUED | OUTPATIENT
Start: 2025-07-02 | End: 2025-07-02 | Stop reason: HOSPADM

## 2025-07-02 RX ORDER — ACETAMINOPHEN 500 MG
1000 TABLET ORAL EVERY 6 HOURS PRN
Status: DISCONTINUED | OUTPATIENT
Start: 2025-07-02 | End: 2025-07-02 | Stop reason: HOSPADM

## 2025-07-02 RX ORDER — HYDROMORPHONE HYDROCHLORIDE 2 MG/ML
0.4 INJECTION, SOLUTION INTRAMUSCULAR; INTRAVENOUS; SUBCUTANEOUS EVERY 5 MIN PRN
Status: DISCONTINUED | OUTPATIENT
Start: 2025-07-02 | End: 2025-07-02 | Stop reason: HOSPADM

## 2025-07-02 RX ORDER — KETOROLAC TROMETHAMINE 30 MG/ML
15 INJECTION, SOLUTION INTRAMUSCULAR; INTRAVENOUS EVERY 6 HOURS PRN
Status: DISCONTINUED | OUTPATIENT
Start: 2025-07-02 | End: 2025-07-02 | Stop reason: HOSPADM

## 2025-07-02 RX ORDER — MUPIROCIN 20 MG/G
OINTMENT TOPICAL
Status: DISCONTINUED | OUTPATIENT
Start: 2025-07-02 | End: 2025-07-02 | Stop reason: HOSPADM

## 2025-07-02 RX ORDER — SODIUM CHLORIDE 9 MG/ML
INJECTION, SOLUTION INTRAVENOUS CONTINUOUS
Status: DISCONTINUED | OUTPATIENT
Start: 2025-07-02 | End: 2025-07-02 | Stop reason: HOSPADM

## 2025-07-02 RX ORDER — FENTANYL CITRATE 50 UG/ML
INJECTION, SOLUTION INTRAMUSCULAR; INTRAVENOUS
Status: DISCONTINUED | OUTPATIENT
Start: 2025-07-02 | End: 2025-07-02

## 2025-07-02 RX ORDER — PROCHLORPERAZINE EDISYLATE 5 MG/ML
5 INJECTION INTRAMUSCULAR; INTRAVENOUS EVERY 30 MIN PRN
Status: DISCONTINUED | OUTPATIENT
Start: 2025-07-02 | End: 2025-07-02 | Stop reason: HOSPADM

## 2025-07-02 RX ORDER — FAMOTIDINE 20 MG/1
20 TABLET, FILM COATED ORAL
Status: COMPLETED | OUTPATIENT
Start: 2025-07-02 | End: 2025-07-02

## 2025-07-02 RX ORDER — HYDROMORPHONE HYDROCHLORIDE 2 MG/ML
0.2 INJECTION, SOLUTION INTRAMUSCULAR; INTRAVENOUS; SUBCUTANEOUS
Status: DISCONTINUED | OUTPATIENT
Start: 2025-07-02 | End: 2025-07-02 | Stop reason: HOSPADM

## 2025-07-02 RX ORDER — LIDOCAINE HYDROCHLORIDE 10 MG/ML
0.5 INJECTION, SOLUTION EPIDURAL; INFILTRATION; INTRACAUDAL; PERINEURAL ONCE
Status: DISCONTINUED | OUTPATIENT
Start: 2025-07-02 | End: 2025-07-02 | Stop reason: HOSPADM

## 2025-07-02 RX ORDER — PROPOFOL 10 MG/ML
VIAL (ML) INTRAVENOUS
Status: DISCONTINUED | OUTPATIENT
Start: 2025-07-02 | End: 2025-07-02

## 2025-07-02 RX ORDER — PHENYLEPHRINE HYDROCHLORIDE 10 MG/ML
INJECTION INTRAVENOUS
Status: DISCONTINUED | OUTPATIENT
Start: 2025-07-02 | End: 2025-07-02

## 2025-07-02 RX ORDER — ONDANSETRON HYDROCHLORIDE 2 MG/ML
4 INJECTION, SOLUTION INTRAVENOUS EVERY 4 HOURS PRN
Status: DISCONTINUED | OUTPATIENT
Start: 2025-07-02 | End: 2025-07-02 | Stop reason: HOSPADM

## 2025-07-02 RX ORDER — OXYCODONE HYDROCHLORIDE 5 MG/1
5 TABLET ORAL
Status: DISCONTINUED | OUTPATIENT
Start: 2025-07-02 | End: 2025-07-02 | Stop reason: HOSPADM

## 2025-07-02 RX ORDER — LIDOCAINE HYDROCHLORIDE 20 MG/ML
INJECTION INTRAVENOUS
Status: DISCONTINUED | OUTPATIENT
Start: 2025-07-02 | End: 2025-07-02

## 2025-07-02 RX ORDER — ONDANSETRON HYDROCHLORIDE 2 MG/ML
INJECTION, SOLUTION INTRAMUSCULAR; INTRAVENOUS
Status: DISCONTINUED | OUTPATIENT
Start: 2025-07-02 | End: 2025-07-02

## 2025-07-02 RX ORDER — ACETIC ACID 5 %
LIQUID (ML) MISCELLANEOUS
Status: DISCONTINUED | OUTPATIENT
Start: 2025-07-02 | End: 2025-07-02 | Stop reason: HOSPADM

## 2025-07-02 RX ORDER — IBUPROFEN 600 MG/1
600 TABLET, FILM COATED ORAL EVERY 6 HOURS PRN
Qty: 30 TABLET | Refills: 1 | Status: SHIPPED | OUTPATIENT
Start: 2025-07-02

## 2025-07-02 RX ORDER — DEXAMETHASONE SODIUM PHOSPHATE 4 MG/ML
INJECTION, SOLUTION INTRA-ARTICULAR; INTRALESIONAL; INTRAMUSCULAR; INTRAVENOUS; SOFT TISSUE
Status: DISCONTINUED | OUTPATIENT
Start: 2025-07-02 | End: 2025-07-02

## 2025-07-02 RX ORDER — SODIUM CHLORIDE, SODIUM LACTATE, POTASSIUM CHLORIDE, CALCIUM CHLORIDE 600; 310; 30; 20 MG/100ML; MG/100ML; MG/100ML; MG/100ML
INJECTION, SOLUTION INTRAVENOUS CONTINUOUS
Status: DISCONTINUED | OUTPATIENT
Start: 2025-07-02 | End: 2025-07-02 | Stop reason: HOSPADM

## 2025-07-02 RX ADMIN — SODIUM CHLORIDE, SODIUM LACTATE, POTASSIUM CHLORIDE, AND CALCIUM CHLORIDE: 600; 310; 30; 20 INJECTION, SOLUTION INTRAVENOUS at 11:07

## 2025-07-02 RX ADMIN — MUPIROCIN: 20 OINTMENT TOPICAL at 09:07

## 2025-07-02 RX ADMIN — PHENYLEPHRINE HYDROCHLORIDE 100 MCG: 10 INJECTION INTRAVENOUS at 12:07

## 2025-07-02 RX ADMIN — LIDOCAINE HYDROCHLORIDE 80 MG: 20 INJECTION, SOLUTION INTRAVENOUS at 12:07

## 2025-07-02 RX ADMIN — DEXAMETHASONE SODIUM PHOSPHATE 4 MG: 4 INJECTION, SOLUTION INTRAMUSCULAR; INTRAVENOUS at 12:07

## 2025-07-02 RX ADMIN — PROPOFOL 150 MG: 10 INJECTION, EMULSION INTRAVENOUS at 12:07

## 2025-07-02 RX ADMIN — FAMOTIDINE 20 MG: 20 TABLET, FILM COATED ORAL at 09:07

## 2025-07-02 RX ADMIN — FENTANYL CITRATE 100 MCG: 50 INJECTION, SOLUTION INTRAMUSCULAR; INTRAVENOUS at 12:07

## 2025-07-02 RX ADMIN — ONDANSETRON 4 MG: 2 INJECTION INTRAMUSCULAR; INTRAVENOUS at 12:07

## 2025-07-02 RX ADMIN — SCOPOLAMINE 1 PATCH: 1.5 PATCH, EXTENDED RELEASE TRANSDERMAL at 09:07

## 2025-07-02 NOTE — OP NOTE
OPERATIVE REPORT    07/02/2025     PREOPERATIVE DIAGNOSIS  1. HPV HR other +    POSTOPERATIVE DIAGNOSIS  1. S/p colposcopy/ECC    PROCEDURE:  Colposcopy and ECC  Pap smear    SURGEON: Luz Short MD    ASSISTANT: Deng Andrade MD - PGY3. Amie Eugene MD - PGY1.     ANESTHESIA: General    COMPLICATIONS: None    EBL: 10 cc    IV FLUIDS: See anesthesia report    FINDINGS:  Normal appearing external genitalia and cervix. Cervix without visible lesions, no acetowhite changes appreciated, no abnormal vasculature or punctuation noted. Biopsies taken at 3,6,9 and 12 oclock. ECC performed. Good hemostasis at conclusion of case.     SPECIMENS:  1.  Pap smear  2.  Cervix, 6 oclock  3.  Cervix, 9 oclock   4.  Cervix, 3 oclock   5.  Cervix, 12 oclock   6.  Endocervical    PROCEDURE:   Patient was taken to the operating room where general anesthesia was administered and found to be adequate.  She was placed in the dorsal lithotomy position using Yellowfin stirrups. A surgical timeout was performed with patient's name, date of birth, procedure to be performed, and allergies verbalized. All OR staff in agreement. No preoperative antibiotics were administered as none were indicated.    Attention was then turned to the vagina where speculum was placed. The vulva and vagina were inspected and found to be grossly normal. A pap smear was collected.     Acetic acid applied to cervix and cervix examined under colposcope. Cervix without visible lesions, no acetowhite changes appreciated, no abnormal vasculature or punctuation noted. Squamocolumnar junction was visualized.  Biopsy was taken at 12,3,6,9 o'clock.  ECC was performed.  Colposcopic impression: benign.    Hemostasis was adequate with application of Monsel's solution. The speculum was removed. The patient tolerated the procedure well. All collected specimens sent to pathology for histologic analysis.    Sponge and instrument counts were correct x 2. The patient tolerated the  procedure well and was awakened without difficulty. She was taken to the recovery room in stable condition.    Amie Eugene M.D.  Obstetrics and Gynecology  PGY-1

## 2025-07-02 NOTE — INTERVAL H&P NOTE
Christin SUGGS Cousin is 34 y.o. presenting for scheduled colposcopy and ECC for HPV HR other + on Pap smear. PMH of cerebal palsy and she has limited mobility. Pt was unable to tolerate speculum exam for repeat testing due to pain and discomfort in clinic. No changes or updates to patient's history, nor has she started taking any new medications since being seen in clinic on 6/12/2025.     Temp:  [97.1 °F (36.2 °C)] 97.1 °F (36.2 °C)  Pulse:  [105] 105  Resp:  [18] 18  SpO2:  [100 %] 100 %  BP: (130)/(98) 130/98    General: NAD, alert, oriented, cooperative  HEENT: NCAT, EOM grossly intact  Lungs: Normal WOB  Heart: regular rate  Abdomen: soft, nondistended, nontender, no rebound or guarding    Consents in chart. Pre-operative heparin not indicated. All questions answered and concerns addressed. To OR for planned procedure.    Amie Eugene M.D.  Obstetrics and Gynecology  PGY-1

## 2025-07-02 NOTE — ANESTHESIA POSTPROCEDURE EVALUATION
Anesthesia Post Evaluation    Patient: Christin Dukes    Procedure(s) Performed: Procedure(s) (LRB):  COLPOSCOPY, CERVIX AND VAGINA (N/A)  PAP SMEAR, CERVICAL (N/A)    Final Anesthesia Type: general      Patient location during evaluation: PACU  Patient participation: Yes- Able to Participate  Level of consciousness: awake and alert  Post-procedure vital signs: reviewed and stable  Pain management: adequate  Airway patency: patent  RENAE mitigation strategies: Extubation while patient is awake  PONV status at discharge: No PONV  Anesthetic complications: no      Cardiovascular status: hemodynamically stable  Respiratory status: unassisted  Hydration status: euvolemic  Follow-up not needed.              Vitals Value Taken Time   /69 07/02/25 13:58   Temp 36.6 °C (97.8 °F) 07/02/25 13:16   Pulse 86 07/02/25 13:58   Resp 16 07/02/25 13:58   SpO2 97 % 07/02/25 13:58         Event Time   Out of Recovery 13:27:34         Pain/Jorge Alberto Score: Jorge Alberto Score: 10 (7/2/2025  1:58 PM)  Modified Jorge Alberto Score: 20 (7/2/2025  1:56 PM)

## 2025-07-02 NOTE — DISCHARGE SUMMARY
"Discharge Summary  Gynecology    Admit Date: 7/2/2025    Discharge Date and Time: 7/2/2025     Attending Physician: Luz Short MD    Principal Diagnoses:   Status post colposcopy    Active Hospital Problems    Diagnosis  POA    *Status post colposcopy [Z98.890]  Not Applicable      Resolved Hospital Problems   No resolved problems to display.     Procedures:   Procedure(s) (LRB):  COLPOSCOPY, CERVIX AND VAGINA (N/A)  PAP SMEAR, CERVICAL (N/A)    Discharged Condition: good    Hospital Course:   Christin Dukes is a 34 y.o. y.o. No obstetric history on file. female who presented on 7/2/2025 for the above-listed procedures for the treatment of persistent HPV. PMH is significant for cerebral palsy, depression, GERD and scoliosis. Patient tolerated the procedure well and was admitted for post-operative care. Post-operative course was uncomplicated.    On day of discharge (POD#0), patient was in stable condition, having met all post-operative milestones. She was urinating spontaneously, ambulating, and tolerating a regular diet without nausea/vomiting. Pain was well-controlled on oral medication. She was discharged with medications and follow up as listed below.     Consults: None    Significant Diagnostic Studies:  No results for input(s): "WBC", "HGB", "HCT", "MCV", "PLT" in the last 168 hours.     Treatments:   1. Surgery as above    Disposition: Home or Self Care    Patient Instructions:   Current Discharge Medication List        START taking these medications    Details   !! ibuprofen (ADVIL,MOTRIN) 600 MG tablet Take 1 tablet (600 mg total) by mouth every 6 (six) hours as needed for Pain.  Qty: 30 tablet, Refills: 1       !! - Potential duplicate medications found. Please discuss with provider.        CONTINUE these medications which have NOT CHANGED    Details   budesonide 1 mg/2 mL NbSp 1 mL oral Once a day for 90 days      calcium-vitamin D3 500 mg(1,250mg) -200 unit per tablet Take 1 tablet by mouth once " daily.       carbidopa-levodopa  mg (SINEMET)  mg per tablet Take 1 tablet by mouth 3 (three) times daily.      ciprofloxacin-dexAMETHasone 0.3-0.1% (CIPRODEX) 0.3-0.1 % DrpS SHAKE LIQUID AND INSTILL 4 DROPS TO LEFT EAR TWICE DAILY FOR 7 DAYS      diazePAM (VALIUM) 2 MG tablet Take 2 mg by mouth 3 (three) times daily.      DUPIXENT  mg/2 mL PnIj Inject into the skin once a week.      !! ibuprofen (ADVIL,MOTRIN) 600 MG tablet Take 600 mg by mouth 2 (two) times daily.      levocetirizine (XYZAL) 5 MG tablet Take 5 mg by mouth.      levonorgestrel-ethinyl estradiol (KURVELO, 28,) 0.15-0.03 mg per tablet TAKE 1 TABLET BY MOUTH EVERY DAY  Qty: 84 tablet, Refills: 4    Associated Diagnoses: Encounter for surveillance of contraceptive pills; Menorrhagia with regular cycle      LIDOcaine (LIDODERM) 5 % 1 patch every morning.      methocarbamoL (ROBAXIN) 750 MG Tab Take 750 mg by mouth 3 (three) times daily.      mupirocin (BACTROBAN) 2 % ointment SMARTSI Application Topical 2-3 Times Daily      naproxen (NAPROSYN ORAL) Take by mouth as needed.      neomycin-polymyxin-hydrocortisone (CORTISPORIN) 3.5-10,000-1 mg/mL-unit/mL-% otic suspension SMARTSIG:Left Ear      pantoprazole (PROTONIX) 40 MG tablet Take 40 mg by mouth every morning.      sertraline (ZOLOFT) 100 MG tablet Take 100 mg by mouth. Patient state she take 150mg      EPINEPHrine (EPIPEN) 0.3 mg/0.3 mL AtIn as directed      polyethylene glycol (GLYCOLAX) 17 gram/dose powder Take 17 g by mouth once daily.  Qty: 850 g, Refills: 0       !! - Potential duplicate medications found. Please discuss with provider.          Discharge Procedure Orders   Diet general     Leave dressing on - Keep it clean, dry, and intact until clinic visit     Remove dressing in 24 hours   Order Comments: If you have a bandage on wound, you may remove it the day after dismissal.  If you had steri-strips remove them once they begin to peel off (usually 2 weeks). Keep  incision clean and dry.  Inspect the incision daily for signs and symptoms of infection.     Call MD for:  temperature >100.4     Call MD for:  persistent nausea and vomiting     Call MD for:  severe uncontrolled pain     Call MD for:  difficulty breathing, headache or visual disturbances     Call MD for:  redness, tenderness, or signs of infection (pain, swelling, redness, odor or green/yellow discharge around incision site)     Call MD for:  hives     Call MD for:   Order Comments: inability to void,urine is ketchup colored or you have large clots, vaginal bleeding is heavier than a period.    VAGINAL DISCHARGE: You may develop a vaginal discharge and intermittent vaginal spotting after surgery and up to 6 weeks postoperatively.  The discharge may have an odor and may change in color but it is normal.  This is due to dissolving stiches.  Contact your surgical team if you develop vaginal or vulvar irritation along with a discharge.  Also contact your surgical team if you have vaginal discharge that smells like urine or stool.    PAIN MEDICATIONS:     Take your pain medications as instructed. It is best to take pain medications before your pain becomes severe. This will allow you to take less medication yet have better pain relief. For the first 2 or 3 days it may be helpful to take your pain medications on a regular schedule (e.g. every 4 to 6 hours). This will help you to keep your pain under better control. You should then begin to take fewer medications each day until you no longer need them. Do not take pain medication on an empty stomach. This may lead to nausea and vomiting.    CONSTIPATION REMEDIES: Patients are often constipated after surgery or with use of oral narcotic medicine. You should continue to take the stool softener, Senokot-S during the next six weeks, and consume adequate amounts of water.  If you have not had a bowel movement for 3 days after dismissal, or are uncomfortable and unable to pass  stool, please try one or all of the following measures:  1.  Milk of Magnesia - 30 cc by mouth every 12 hours   2.  Dulcolax suppository - One suppository per rectum every 4-6 hours   3.  Metamucil, Fibercon or other bulk former - use as directed  4.  Fleets Enema  5.  Prunes or Prune juice    If you continue to have constipation after trying the above remedies, you should contact your surgical team using the contact information listed above     Activity as tolerated   Order Comments: Return to normal activity slowly as you feel able        Follow-up Information       [facility] Follow up.    Specialty: Obstetrics and Gynecology  Why: As needed and for annual Well Woman Exam  Contact information:  [place]  [address] [address] [phone]                         Amie Eugene M.D.  Obstetrics and Gynecology  PGY-1

## 2025-07-02 NOTE — TRANSFER OF CARE
"Anesthesia Transfer of Care Note    Patient: Christin Dukes    Procedure(s) Performed: Procedure(s) (LRB):  COLPOSCOPY, CERVIX AND VAGINA (N/A)  PAP SMEAR, CERVICAL (N/A)    Patient location: PACU    Anesthesia Type: general    Transport from OR: Transported from OR on 6-10 L/min O2 by face mask with adequate spontaneous ventilation    Post pain: adequate analgesia    Post assessment: no apparent anesthetic complications and tolerated procedure well    Post vital signs: stable    Level of consciousness: sedated    Nausea/Vomiting: no nausea/vomiting    Complications: none    Transfer of care protocol was followed    Last vitals: Visit Vitals  BP (!) 130/98 (BP Location: Right arm, Patient Position: Sitting)   Pulse 105   Temp 36.2 °C (97.1 °F) (Oral)   Resp 18   Ht 5' 5" (1.651 m)   Wt 49.9 kg (110 lb)   LMP 06/04/2025   SpO2 100%   Breastfeeding No   BMI 18.30 kg/m²     "

## 2025-07-02 NOTE — ANESTHESIA PROCEDURE NOTES
Intubation    Date/Time: 7/2/2025 12:19 PM    Performed by: Gloria Mejia CRNA  Authorized by: Jacobo Dang MD    Intubation:     Induction:  Intravenous    Intubated:  Postinduction    Mask Ventilation:  Easy mask    Attempts:  1    Attempted By:  CRNA    Difficult Airway Encountered?: No      Complications:  None    Airway Device:  Supraglottic airway/LMA    Airway Device Size:  3.0    Placement Verified By:  Capnometry    Complicating Factors:  None    Findings Post-Intubation:  Atraumatic/condition of teeth unchanged and BS equal bilateral

## 2025-07-02 NOTE — PLAN OF CARE
Christin Dukes has met all discharge criteria from Phase II. Vital Signs are stable, ambulating  without difficulty. Discharge instructions given, patient verbalized understanding. Discharged from facility via wheelchair in stable condition.

## 2025-07-03 VITALS
HEART RATE: 86 BPM | BODY MASS INDEX: 18.33 KG/M2 | WEIGHT: 110 LBS | HEIGHT: 65 IN | OXYGEN SATURATION: 97 % | DIASTOLIC BLOOD PRESSURE: 69 MMHG | TEMPERATURE: 98 F | SYSTOLIC BLOOD PRESSURE: 124 MMHG | RESPIRATION RATE: 16 BRPM

## 2025-07-10 LAB
INSULIN SERPL-ACNC: ABNORMAL U[IU]/ML
LAB AP BETHESDA CATEGORY: ABNORMAL
LAB AP CLINICAL FINDINGS: ABNORMAL
LAB AP CONTRACEPTIVES: ABNORMAL
LAB AP DIAGNOISIS OR INDICATION FOR TEST: ABNORMAL
LAB AP LMP DATE: ABNORMAL
LAB AP OCHS PAP SPECIMEN ADEQUACY: ABNORMAL
LAB AP OHS PAP INTERPRETATION: ABNORMAL
LAB AP PAP DISCLAIMER COMMENTS: ABNORMAL
LAB AP PAP ESTROGEN REPLACEMENT THERAPY: ABNORMAL
LAB AP PAP PMP: ABNORMAL
LAB AP PAP PREVIOUS BX: ABNORMAL
LAB AP PAP PRIOR TREATMENT: ABNORMAL
LAB AP PERFORMING LOCATION(S): ABNORMAL

## 2025-07-11 ENCOUNTER — PATIENT MESSAGE (OUTPATIENT)
Dept: OBSTETRICS AND GYNECOLOGY | Facility: CLINIC | Age: 35
End: 2025-07-11
Payer: MEDICAID

## 2025-07-11 LAB
DHEA SERPL-MCNC: NORMAL
ESTROGEN SERPL-MCNC: NORMAL PG/ML
HPV DNA, HIGH RISK TYPE 16, PCR (OHS): NEGATIVE
HPV DNA, HIGH RISK TYPE 18, PCR (OHS): NEGATIVE
HPV DNA, HIGH RISK TYPE OTHER, PCR (OHS): NEGATIVE
INSULIN SERPL-ACNC: NORMAL U[IU]/ML
LAB AP CLINICAL INFORMATION: NORMAL
LAB AP GROSS DESCRIPTION: NORMAL
LAB AP PERFORMING LOCATION(S): NORMAL
LAB AP REPORT FOOTNOTES: NORMAL
T3RU NFR SERPL: NORMAL %

## 2025-07-11 RX ORDER — DOXYCYCLINE 100 MG/1
100 CAPSULE ORAL EVERY 12 HOURS
Qty: 14 CAPSULE | Refills: 0 | Status: SHIPPED | OUTPATIENT
Start: 2025-07-11 | End: 2025-07-18

## 2025-07-14 ENCOUNTER — PATIENT MESSAGE (OUTPATIENT)
Dept: OBSTETRICS AND GYNECOLOGY | Facility: CLINIC | Age: 35
End: 2025-07-14
Payer: MEDICAID

## 2025-07-14 DIAGNOSIS — N72 CERVICITIS: Primary | ICD-10-CM

## 2025-07-14 PROBLEM — Z98.890 STATUS POST COLPOSCOPY: Status: RESOLVED | Noted: 2025-07-02 | Resolved: 2025-07-14

## 2025-07-14 RX ORDER — DOXYCYCLINE 100 MG/1
100 CAPSULE ORAL EVERY 12 HOURS
Qty: 14 CAPSULE | Refills: 0 | Status: SHIPPED | OUTPATIENT
Start: 2025-07-14 | End: 2025-07-21

## (undated) DEVICE — SUT MCRYL PLUS 4-0 PS2 27IN

## (undated) DEVICE — DRAPE STERI INSTRUMENT 1018

## (undated) DEVICE — ELECTRODE REM PLYHSV RETURN 9

## (undated) DEVICE — Device

## (undated) DEVICE — TROCAR ENDOPATH XCEL 5MM 7.5CM

## (undated) DEVICE — TRAY MINOR GEN SURG

## (undated) DEVICE — WARMER DRAPE STERILE LF

## (undated) DEVICE — GLOVE SENSICARE PI GRN 6.5

## (undated) DEVICE — SOL IRR SOD CHL .9% POUR

## (undated) DEVICE — SUT 0 VICRYL / UR6 (J603)

## (undated) DEVICE — SEE MEDLINE ITEM 152622

## (undated) DEVICE — ADHESIVE MASTISOL VIAL 48/BX

## (undated) DEVICE — TROCAR ENDOPATH XCEL 12X100MM

## (undated) DEVICE — DRAPE ABDOMINAL TIBURON 14X11

## (undated) DEVICE — CLOSURE SKIN STERI STRIP 1/2X4

## (undated) DEVICE — SOL NS 1000CC

## (undated) DEVICE — TUBING HF INSUFFLATION W/ FLTR

## (undated) DEVICE — BLADE SURG CARBON STEEL SZ11

## (undated) DEVICE — DRESSING TELFA N ADH 3X8

## (undated) DEVICE — SCISSOR 5MMX35CM DIRECT DRIVE

## (undated) DEVICE — APPLIER CLIP ENDO LIGAMAX 5MM

## (undated) DEVICE — KIT ANTIFOG

## (undated) DEVICE — DRESSING TRANS 4X4 TEGADERM

## (undated) DEVICE — DRESSING TELFA STRL 4X3 LF

## (undated) DEVICE — GLOVE SENSICARE PI SURG 6.5

## (undated) DEVICE — NDL 18GA X1 1/2 REG BEVEL

## (undated) DEVICE — IRRIGATOR ENDOSCOPY DISP.